# Patient Record
Sex: MALE | Race: WHITE | NOT HISPANIC OR LATINO | URBAN - METROPOLITAN AREA
[De-identification: names, ages, dates, MRNs, and addresses within clinical notes are randomized per-mention and may not be internally consistent; named-entity substitution may affect disease eponyms.]

---

## 2020-12-01 ENCOUNTER — INPATIENT (INPATIENT)
Facility: HOSPITAL | Age: 85
LOS: 36 days | Discharge: NURSING, REHAB OR LONG-TERM CARE | DRG: 85 | End: 2021-01-07
Attending: INTERNAL MEDICINE | Admitting: SURGERY
Payer: MEDICARE

## 2020-12-01 VITALS
HEIGHT: 73 IN | WEIGHT: 214.95 LBS | OXYGEN SATURATION: 100 % | TEMPERATURE: 99 F | DIASTOLIC BLOOD PRESSURE: 61 MMHG | RESPIRATION RATE: 18 BRPM | HEART RATE: 90 BPM | SYSTOLIC BLOOD PRESSURE: 134 MMHG

## 2020-12-01 DIAGNOSIS — I60.9 NONTRAUMATIC SUBARACHNOID HEMORRHAGE, UNSPECIFIED: ICD-10-CM

## 2020-12-01 LAB
ALBUMIN SERPL ELPH-MCNC: 3.3 G/DL — SIGNIFICANT CHANGE UP (ref 3.3–5)
ALP SERPL-CCNC: 61 U/L — SIGNIFICANT CHANGE UP (ref 40–120)
ALT FLD-CCNC: 18 U/L — SIGNIFICANT CHANGE UP (ref 12–78)
ANION GAP SERPL CALC-SCNC: 4 MMOL/L — LOW (ref 5–17)
APPEARANCE UR: ABNORMAL
APTT BLD: 57.5 SEC — HIGH (ref 27.5–35.5)
AST SERPL-CCNC: 34 U/L — SIGNIFICANT CHANGE UP (ref 15–37)
BASOPHILS # BLD AUTO: 0 K/UL — SIGNIFICANT CHANGE UP (ref 0–0.2)
BASOPHILS NFR BLD AUTO: 0 % — SIGNIFICANT CHANGE UP (ref 0–2)
BILIRUB SERPL-MCNC: 0.7 MG/DL — SIGNIFICANT CHANGE UP (ref 0.2–1.2)
BILIRUB UR-MCNC: NEGATIVE — SIGNIFICANT CHANGE UP
BUN SERPL-MCNC: 33 MG/DL — HIGH (ref 7–23)
CALCIUM SERPL-MCNC: 8.7 MG/DL — SIGNIFICANT CHANGE UP (ref 8.5–10.1)
CHLORIDE SERPL-SCNC: 109 MMOL/L — HIGH (ref 96–108)
CO2 SERPL-SCNC: 25 MMOL/L — SIGNIFICANT CHANGE UP (ref 22–31)
COLOR SPEC: YELLOW — SIGNIFICANT CHANGE UP
CREAT SERPL-MCNC: 1.33 MG/DL — HIGH (ref 0.5–1.3)
DIFF PNL FLD: ABNORMAL
EOSINOPHIL # BLD AUTO: 0 K/UL — SIGNIFICANT CHANGE UP (ref 0–0.5)
EOSINOPHIL NFR BLD AUTO: 0 % — SIGNIFICANT CHANGE UP (ref 0–6)
GLUCOSE SERPL-MCNC: 100 MG/DL — HIGH (ref 70–99)
GLUCOSE UR QL: NEGATIVE MG/DL — SIGNIFICANT CHANGE UP
HCT VFR BLD CALC: 39.3 % — SIGNIFICANT CHANGE UP (ref 39–50)
HGB BLD-MCNC: 12.7 G/DL — LOW (ref 13–17)
IMM GRANULOCYTES NFR BLD AUTO: 0.4 % — SIGNIFICANT CHANGE UP (ref 0–1.5)
INR BLD: 6.2 RATIO — CRITICAL HIGH (ref 0.88–1.16)
KETONES UR-MCNC: ABNORMAL
LEUKOCYTE ESTERASE UR-ACNC: ABNORMAL
LYMPHOCYTES # BLD AUTO: 0.55 K/UL — LOW (ref 1–3.3)
LYMPHOCYTES # BLD AUTO: 9.7 % — LOW (ref 13–44)
MCHC RBC-ENTMCNC: 29.5 PG — SIGNIFICANT CHANGE UP (ref 27–34)
MCHC RBC-ENTMCNC: 32.3 GM/DL — SIGNIFICANT CHANGE UP (ref 32–36)
MCV RBC AUTO: 91.2 FL — SIGNIFICANT CHANGE UP (ref 80–100)
MONOCYTES # BLD AUTO: 0.63 K/UL — SIGNIFICANT CHANGE UP (ref 0–0.9)
MONOCYTES NFR BLD AUTO: 11.2 % — SIGNIFICANT CHANGE UP (ref 2–14)
NEUTROPHILS # BLD AUTO: 4.45 K/UL — SIGNIFICANT CHANGE UP (ref 1.8–7.4)
NEUTROPHILS NFR BLD AUTO: 78.7 % — HIGH (ref 43–77)
NITRITE UR-MCNC: POSITIVE
PH UR: 5 — SIGNIFICANT CHANGE UP (ref 5–8)
PLATELET # BLD AUTO: 127 K/UL — LOW (ref 150–400)
POTASSIUM SERPL-MCNC: 4.4 MMOL/L — SIGNIFICANT CHANGE UP (ref 3.5–5.3)
POTASSIUM SERPL-SCNC: 4.4 MMOL/L — SIGNIFICANT CHANGE UP (ref 3.5–5.3)
PROT SERPL-MCNC: 7.5 GM/DL — SIGNIFICANT CHANGE UP (ref 6–8.3)
PROT UR-MCNC: 100 MG/DL
PROTHROM AB SERPL-ACNC: 65.9 SEC — HIGH (ref 10.6–13.6)
RBC # BLD: 4.31 M/UL — SIGNIFICANT CHANGE UP (ref 4.2–5.8)
RBC # FLD: 15 % — HIGH (ref 10.3–14.5)
SARS-COV-2 RNA SPEC QL NAA+PROBE: DETECTED
SODIUM SERPL-SCNC: 138 MMOL/L — SIGNIFICANT CHANGE UP (ref 135–145)
SP GR SPEC: 1.01 — SIGNIFICANT CHANGE UP (ref 1.01–1.02)
UROBILINOGEN FLD QL: NEGATIVE MG/DL — SIGNIFICANT CHANGE UP
WBC # BLD: 5.65 K/UL — SIGNIFICANT CHANGE UP (ref 3.8–10.5)
WBC # FLD AUTO: 5.65 K/UL — SIGNIFICANT CHANGE UP (ref 3.8–10.5)

## 2020-12-01 PROCEDURE — 80053 COMPREHEN METABOLIC PANEL: CPT

## 2020-12-01 PROCEDURE — 72125 CT NECK SPINE W/O DYE: CPT | Mod: 26

## 2020-12-01 PROCEDURE — 93005 ELECTROCARDIOGRAM TRACING: CPT

## 2020-12-01 PROCEDURE — 70450 CT HEAD/BRAIN W/O DYE: CPT

## 2020-12-01 PROCEDURE — 85610 PROTHROMBIN TIME: CPT

## 2020-12-01 PROCEDURE — 74177 CT ABD & PELVIS W/CONTRAST: CPT | Mod: 26

## 2020-12-01 PROCEDURE — U0005: CPT

## 2020-12-01 PROCEDURE — 87040 BLOOD CULTURE FOR BACTERIA: CPT

## 2020-12-01 PROCEDURE — 85730 THROMBOPLASTIN TIME PARTIAL: CPT

## 2020-12-01 PROCEDURE — 85025 COMPLETE CBC W/AUTO DIFF WBC: CPT

## 2020-12-01 PROCEDURE — 92610 EVALUATE SWALLOWING FUNCTION: CPT | Mod: GN

## 2020-12-01 PROCEDURE — 71260 CT THORAX DX C+: CPT | Mod: 26

## 2020-12-01 PROCEDURE — 81001 URINALYSIS AUTO W/SCOPE: CPT

## 2020-12-01 PROCEDURE — 76376 3D RENDER W/INTRP POSTPROCES: CPT

## 2020-12-01 PROCEDURE — 70551 MRI BRAIN STEM W/O DYE: CPT | Mod: 52

## 2020-12-01 PROCEDURE — U0003: CPT

## 2020-12-01 PROCEDURE — 71045 X-RAY EXAM CHEST 1 VIEW: CPT | Mod: 26

## 2020-12-01 PROCEDURE — 93010 ELECTROCARDIOGRAM REPORT: CPT

## 2020-12-01 PROCEDURE — 86769 SARS-COV-2 COVID-19 ANTIBODY: CPT

## 2020-12-01 PROCEDURE — 73200 CT UPPER EXTREMITY W/O DYE: CPT | Mod: LT

## 2020-12-01 PROCEDURE — 36415 COLL VENOUS BLD VENIPUNCTURE: CPT

## 2020-12-01 PROCEDURE — 80048 BASIC METABOLIC PNL TOTAL CA: CPT

## 2020-12-01 PROCEDURE — 97116 GAIT TRAINING THERAPY: CPT | Mod: GP

## 2020-12-01 PROCEDURE — 97530 THERAPEUTIC ACTIVITIES: CPT | Mod: GP

## 2020-12-01 PROCEDURE — 99222 1ST HOSP IP/OBS MODERATE 55: CPT | Mod: CS

## 2020-12-01 PROCEDURE — 73080 X-RAY EXAM OF ELBOW: CPT | Mod: 26,LT

## 2020-12-01 PROCEDURE — 70450 CT HEAD/BRAIN W/O DYE: CPT | Mod: 26

## 2020-12-01 PROCEDURE — 99223 1ST HOSP IP/OBS HIGH 75: CPT | Mod: CS

## 2020-12-01 PROCEDURE — 97163 PT EVAL HIGH COMPLEX 45 MIN: CPT | Mod: GP

## 2020-12-01 PROCEDURE — 92523 SPEECH SOUND LANG COMPREHEN: CPT | Mod: GN

## 2020-12-01 PROCEDURE — 72170 X-RAY EXAM OF PELVIS: CPT | Mod: 26

## 2020-12-01 PROCEDURE — 85027 COMPLETE CBC AUTOMATED: CPT

## 2020-12-01 RX ORDER — CIPROFLOXACIN LACTATE 400MG/40ML
400 VIAL (ML) INTRAVENOUS ONCE
Refills: 0 | Status: COMPLETED | OUTPATIENT
Start: 2020-12-01 | End: 2020-12-02

## 2020-12-01 RX ORDER — TAMSULOSIN HYDROCHLORIDE 0.4 MG/1
0.4 CAPSULE ORAL AT BEDTIME
Refills: 0 | Status: DISCONTINUED | OUTPATIENT
Start: 2020-12-01 | End: 2021-01-07

## 2020-12-01 RX ORDER — SERTRALINE 25 MG/1
1 TABLET, FILM COATED ORAL
Qty: 0 | Refills: 0 | DISCHARGE

## 2020-12-01 RX ORDER — FINASTERIDE 5 MG/1
5 TABLET, FILM COATED ORAL DAILY
Refills: 0 | Status: DISCONTINUED | OUTPATIENT
Start: 2020-12-01 | End: 2021-01-07

## 2020-12-01 RX ORDER — SODIUM CHLORIDE 9 MG/ML
1000 INJECTION INTRAMUSCULAR; INTRAVENOUS; SUBCUTANEOUS
Refills: 0 | Status: DISCONTINUED | OUTPATIENT
Start: 2020-12-01 | End: 2020-12-03

## 2020-12-01 RX ORDER — ONDANSETRON 8 MG/1
4 TABLET, FILM COATED ORAL EVERY 6 HOURS
Refills: 0 | Status: DISCONTINUED | OUTPATIENT
Start: 2020-12-01 | End: 2021-01-07

## 2020-12-01 RX ORDER — POTASSIUM CHLORIDE 20 MEQ
1 PACKET (EA) ORAL
Qty: 0 | Refills: 0 | DISCHARGE

## 2020-12-01 RX ORDER — TAMSULOSIN HYDROCHLORIDE 0.4 MG/1
1 CAPSULE ORAL
Qty: 0 | Refills: 0 | DISCHARGE

## 2020-12-01 RX ORDER — ACETAMINOPHEN 500 MG
650 TABLET ORAL EVERY 4 HOURS
Refills: 0 | Status: DISCONTINUED | OUTPATIENT
Start: 2020-12-01 | End: 2021-01-07

## 2020-12-01 RX ORDER — METRONIDAZOLE 500 MG
500 TABLET ORAL ONCE
Refills: 0 | Status: COMPLETED | OUTPATIENT
Start: 2020-12-01 | End: 2020-12-02

## 2020-12-01 RX ORDER — SERTRALINE 25 MG/1
50 TABLET, FILM COATED ORAL DAILY
Refills: 0 | Status: DISCONTINUED | OUTPATIENT
Start: 2020-12-01 | End: 2021-01-07

## 2020-12-01 RX ORDER — PHYTONADIONE (VIT K1) 5 MG
5 TABLET ORAL ONCE
Refills: 0 | Status: COMPLETED | OUTPATIENT
Start: 2020-12-01 | End: 2020-12-01

## 2020-12-01 RX ORDER — ATORVASTATIN CALCIUM 80 MG/1
20 TABLET, FILM COATED ORAL AT BEDTIME
Refills: 0 | Status: DISCONTINUED | OUTPATIENT
Start: 2020-12-01 | End: 2021-01-07

## 2020-12-01 RX ORDER — FINASTERIDE 5 MG/1
1 TABLET, FILM COATED ORAL
Qty: 0 | Refills: 0 | DISCHARGE

## 2020-12-01 RX ORDER — ROSUVASTATIN CALCIUM 5 MG/1
1 TABLET ORAL
Qty: 0 | Refills: 0 | DISCHARGE

## 2020-12-01 RX ADMIN — Medication 650 MILLIGRAM(S): at 14:18

## 2020-12-01 RX ADMIN — FINASTERIDE 5 MILLIGRAM(S): 5 TABLET, FILM COATED ORAL at 12:56

## 2020-12-01 RX ADMIN — SODIUM CHLORIDE 75 MILLILITER(S): 9 INJECTION INTRAMUSCULAR; INTRAVENOUS; SUBCUTANEOUS at 21:00

## 2020-12-01 RX ADMIN — TAMSULOSIN HYDROCHLORIDE 0.4 MILLIGRAM(S): 0.4 CAPSULE ORAL at 21:09

## 2020-12-01 RX ADMIN — SODIUM CHLORIDE 75 MILLILITER(S): 9 INJECTION INTRAMUSCULAR; INTRAVENOUS; SUBCUTANEOUS at 11:00

## 2020-12-01 RX ADMIN — ATORVASTATIN CALCIUM 20 MILLIGRAM(S): 80 TABLET, FILM COATED ORAL at 21:09

## 2020-12-01 RX ADMIN — Medication 101 MILLIGRAM(S): at 06:40

## 2020-12-01 RX ADMIN — SERTRALINE 50 MILLIGRAM(S): 25 TABLET, FILM COATED ORAL at 12:56

## 2020-12-01 RX ADMIN — Medication 650 MILLIGRAM(S): at 11:00

## 2020-12-01 NOTE — H&P ADULT - HISTORY OF PRESENT ILLNESS
89 yo male brought to ER by EMS after fall at home. Pt states he was walking with walker and "slid" down to his buttock. Pt denies hitting his head. 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 Pt states he had been sliding down frequently. in ED ABC intact HD stabe. No headache no neck pain. no SOB, no fever. No chest or abdominal pain. Mild  left elbow pain abrasion no focal neurological complaint, Moves all extremities  He was noted to have old ecchymosis to left side of face and scalp when asked about it pt states he does not recall hitting his head a head Ct was ordering by the ER physician after his INR was found to be at 6.20. Poor historian, very [place].

## 2020-12-01 NOTE — SWALLOW BEDSIDE ASSESSMENT ADULT - SWALLOW EVAL: DIAGNOSIS
1) Hand to mouth movement was tremulous but he was able to feed self. The pt demonstrates mild functional Oropharyngeal Presbyphagia with oral presbyphagic features that may appear heightened with foods that require mastication at times due to many missing teeth. With that being stated, the pt demonstrates sufficient oropharyngeal swallowing preservation for age nonetheless(88) when he is in an alert state. NO behavioral aspiration signs exhibited. Oropharyngeal swallowing integrity is felt to be stable for age when pt is alert and likely at usual state.  2) The pt was alert and interactive. He was somewhat anxious and distractible at times. With that being stated, his motor speech abilities were functional, his speech intelligibility was good and his verbalizations were linguistically intact. The pt was able to verbalize his needs. 1) Hand to mouth movement was tremulous but pt was able to feed self. The pt demonstrates mild functional Oropharyngeal Presbyphagia with oral presbyphagic features that may appear heightened with foods that require mastication at times due to many missing teeth. With that being stated, the pt demonstrates sufficient oropharyngeal swallowing preservation for age nonetheless(88) when he is in an alert state. NO behavioral aspiration signs exhibited. Oropharyngeal swallowing integrity is felt to be stable for age when pt is alert and likely at usual state.  2) The pt was alert and interactive. He was somewhat anxious and distractible at times. With that being stated, his motor speech abilities were functional, his speech intelligibility was good and his verbalizations were linguistically intact. The pt was able to verbalize his needs.

## 2020-12-01 NOTE — CONSULT NOTE ADULT - SUBJECTIVE AND OBJECTIVE BOX
Patient is a 88y old  Male who presents with a chief complaint of     HPI: 89 yo male brought to ER by EMS after fall at home. Pt states he was walking with walker and "slid" down to his buttock. Pt denies hitting his head. 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 Pt states he had been sliding down" frequently. He was noted to have old ecchymosis to left side of face and scalp when asked about it pt states he does not recall hitting his head a head Ct was ordering by the ER physician after his INR was foudn to be at 6.20. head Ct shows a small amount of layering IVH and a small area of hyperintensity in the left midbrain. Pt is a poor historian and is unclear why he is on full dose AC.         PAST MEDICAL & SURGICAL HISTORY:  Cerebrovascular accident (CVA), unspecified mechanism        FAMILY HISTORY:      Social Hx:  Nonsmoker, no drug or alcohol use    MEDICATIONS  (STANDING):       Allergies    No Known Allergies    Intolerances        ROS: Pertinent positives in HPI, all other ROS were reviewed and are negative.      Vital Signs Last 24 Hrs  T(C): 37.3 (01 Dec 2020 04:23), Max: 37.3 (01 Dec 2020 04:23)  T(F): 99.1 (01 Dec 2020 04:23), Max: 99.1 (01 Dec 2020 04:23)  HR: 90 (01 Dec 2020 04:23) (90 - 90)  BP: 134/61 (01 Dec 2020 04:23) (134/61 - 134/61)  BP(mean): --  RR: 18 (01 Dec 2020 04:23) (18 - 18)  SpO2: 100% (01 Dec 2020 04:23) (100% - 100%)        Constitutional: awake and alert. [place]  HEENT: + large area of old ecchymosis noted to left temporal and parietal scalp, PERRLA, EOMI,   Neck: Supple. no pain to palpation or ROM   Respiratory: Breath sounds are clear bilaterally  Cardiovascular: S1 and S2, regular  rhythm  Gastrointestinal: soft, nontender  Extremities:  ana LE pitting edema and skin discoloration   Skin: multiple areas of ecchymosis ana UE l>R, head    Neurological exam:  HF: A x O x 3 [place] confused to timeline of events,. Appropriately interactive, normal affect. Speech fluent, No Aphasia or paraphasic errors.   CN: [place], EOMI, VFF, facial sensation normal, no NLFD, tongue midline  Motor: No pronator drift, MILLER x4 antigravity strong, LUE distal contracted, + tremor  Sens: Intact to light touch  Gait/Balance: /Cannot test          Labs:   12-01    138  |  109<H>  |  33<H>  ----------------------------<  100<H>  4.4   |  25  |  1.33<H>    Ca    8.7      01 Dec 2020 04:33    TPro  7.5  /  Alb  3.3  /  TBili  0.7  /  DBili  x   /  AST  34  /  ALT  18  /  AlkPhos  61  12-01                              12.7   5.65  )-----------( 127      ( 01 Dec 2020 04:33 )             39.3       Radiology:  - CT Head:    EXAM:  CT CERVICAL SPINE                          EXAM:  CT BRAIN                            PROCEDURE DATE:  12/01/2020          INTERPRETATION:  CLINICAL INFORMATION: Status post fall on Coumadin.    TECHNIQUE: CT of the head was performed in multiple axial sections from the base of the skull to the vertex with coronal and sagittal reformats.  CT of the cervical spine was performed in bone and soft tissue windows with coronal and sagittal reformats. No intravenous contrast was administered. Beam hardening artifact slightly obscures evaluation of the posterior fossa and brainstem.    COMPARISON: No similar prior studies are available for comparison.    FINDINGS:    Head:  Parenchymal volume loss is noted with prominent ventricles and sulci. Chronic microvascular ischemic changes are identified. Gliosis and encephalomalacia is seen in the right corona radiata extending into the external capsule likely sequela of prior infarction. No acute territorial infarct is demonstrated.    A small amount of hemorrhage is layering in the lateral ventricles, right greater than left. A focus of high attenuation measuring 6 mm is seen adjacent to or within the left posterolateral aspect of the midbrain likely representing subarachnoid hemorrhage; however, a cavernoma could also be considered. Mild asymmetric thickening of the left tentorium is seen suspicious for a layering subdural hematoma.    Evaluation of the osseous structures with the appropriate window appears unremarkable. Vascular calcifications are noted. Sequela of left lens surgery is seen. Moderate mucosal thickening is seen in the right maxillary sinus. Mild mucosal thickening is seen in the left ethmoid sinus. The remaining visualized paranasal sinuses and mastoid air cells are clear. Mild left frontal scalp soft tissue swelling is noted.    Cervical spine:  Bones: Straightening of the normal cervical lordosis is seen which is likely due to muscle spasm versus patient positioning. The cervical vertebral body heights and alignment are maintained. No fracture or spondylolisthesis is demonstrated. Moderate disc space narrowing and marginal osteophyte formation is seen at C4-C5, C5-C6 and C6-C7. Multilevel posterior disc osteophyte complexes are seen without evidence of severe spinal canal stenosis. Multilevel neural foraminal stenosis is noted.    Soft tissues: The prevertebral soft tissues are unremarkable. A 7 mm right thyroid lobe nodule is noted.    Lung apices: Clear.    IMPRESSION:  1. Small amount of layering hemorrhage in the lateral ventricles, left greater than right.  2. Focus of high attenuation adjacent to or within the left posterolateral aspect of the midbrain likely representing subarachnoid hemorrhage; however, a cavernoma could also be considered.  3. Mild asymmetric thickening of the left tentorium suspicious for a layering subdural hematoma.  4. Mild left frontal scalp soft tissue swelling without evidence of an underlying calvarial fracture.  5. Multilevel degenerative changes of the cervical spine without evidence of a fracture.    Call Back:  I discussed this case with Dr. Moulton at 6:02 AM on 12/1/2020.  Hospital policies for call back including read back policy were followed. The verbal communication call back supplements this written report.      HAILEY CARO MD; Attending Radiologist  This document has been electronically signed. Dec  1 2020  6:06AM

## 2020-12-01 NOTE — PATIENT PROFILE ADULT - STATED REASON FOR ADMISSION
Fell on his buttocks while walking Fell on his buttocks while walking, denies losing his consciousness

## 2020-12-01 NOTE — H&P ADULT - NSHPREVIEWOFSYSTEMS_GEN_ALL_CORE
ROS:.  [] A ten-point review of systems was otherwise negative except as noted.  Systemic:	[ ] Fever	[ ] Chills	[ ] Night sweats    [ ] Fatigue	[ ] Other  [] Cardiovascular:  [] Pulmonary:  [] Renal/Urologic:  [] Gastrointestinal: abdominal pain, vomiting  [] Metabolic:  [] Neurologic:  [] Hematologic:  [] ENT:  [] Ophthalmologic:  [] Musculoskeletal:    [ X] Due to altered mental status/intubation, subjective information were not able to be obtained from the patient. History was obtained, to the extent possible, from review of the chart and collateral sources of information. poor historian.

## 2020-12-01 NOTE — ED PROVIDER NOTE - CARE PLAN
Principal Discharge DX:	SAH (subarachnoid hemorrhage)  Secondary Diagnosis:	Intraventricular hemorrhage  Secondary Diagnosis:	Fall, initial encounter  Secondary Diagnosis:	Supratherapeutic INR  Secondary Diagnosis:	Anticoagulated on Coumadin

## 2020-12-01 NOTE — ED PROVIDER NOTE - MUSCULOSKELETAL, MLM
contracture to LUE, + 1 pedal edema, + skin tear to left elbow.  No pain with passive rom extremities.

## 2020-12-01 NOTE — SWALLOW BEDSIDE ASSESSMENT ADULT - PHARYNGEAL PHASE
Swallow triggered in an acceptable time frame for age. Laryngeal lift on palpation during swallowing trials was very mildly decreased but felt to be functional and within age acceptable parameters. NO behavioral aspiration signs exhibited. Odynophagia was denied.

## 2020-12-01 NOTE — SWALLOW BEDSIDE ASSESSMENT ADULT - SWALLOW EVAL: RECOMMENDED DIET
SUGGEST A MECHANICAL SOFT TEXTURE DIET WITH THIN LIQUID CONSISTENCIES AS THE PATIENT TOLERATES THESE FOOD CONSISTENCIES FROM AN OROPHARYNGEAL SWALLOWING STANCE AND FOOD TEXTURES ON THIS DIET ACCOMMODATES PT'S PRESBYPHAGIC/SWALLOW PROFILE & MANY MISSING TEETH.

## 2020-12-01 NOTE — H&P ADULT - NSHPLABSRESULTS_GEN_ALL_CORE
COVID-19 PCR: Detected: Testing is performed using polymerase chain reaction (PCR) or  transcription mediated amplification (TMA). This COVID-19 (SARS-CoV-2)  nucleic acid amplification test was validated by [employer] and is  in use under the FDA Emergency Use Authorization (EUA) for clinical labs  CLIA-certified to perform high complexity testing. Test results should be  correlated with clinical presentation, patient history, and epidemiology. (12.01.20 @ 06:39)    Labs:                          12.7   5.65  )-----------( 127      ( 01 Dec 2020 04:33 )             39.3       12-01    138  |  109<H>  |  33<H>  ----------------------------<  100<H>  4.4   |  25  |  1.33<H>    Ca    8.7      01 Dec 2020 04:33    TPro  7.5  /  Alb  3.3  /  TBili  0.7  /  DBili  x   /  AST  34  /  ALT  18  /  AlkPhos  61  12-01      PT/INR - ( 01 Dec 2020 04:33 )   PT: 65.9 sec;   INR: 6.20 ratio         PTT - ( 01 Dec 2020 04:33 )  PTT:57.5 sec    < from: CT Abdomen and Pelvis w/ IV Cont (12.01.20 @ 07:50) >    CHEST:  LUNGS AND LARGE AIRWAYS: Patent central airways. No pulmonary nodules. Mild dependent atelectasis at both lung bases.  PLEURA: No pleural effusion.  VESSELS: Atherosclerotic changes of the aorta and coronary arteries.  HEART: Cardiomegaly. No pericardial effusion.  MEDIASTINUM AND ELYSSA: No lymphadenopathy.  CHEST WALL AND LOWER NECK: Small bilateral low-attenuation thyroid nodules.    ABDOMEN AND PELVIS:  LIVER: Within normal limits.  BILE DUCTS: Normal caliber.  GALLBLADDER: Cholelithiasis.  SPLEEN: Within normal limits.  PANCREAS: Within normal limits.  ADRENALS: Within normal limits.  KIDNEYS/URETERS: Left renal cysts.    BLADDER: Several bladder diverticula, the largest at the dome.  REPRODUCTIVE ORGANS: Prostate is enlarged.    BOWEL: No bowel obstruction. Appendix is normal. Colonic diverticulosis.  PERITONEUM: No ascites.  VESSELS: Atherosclerotic changes.  RETROPERITONEUM/LYMPH NODES: No lymphadenopathy or hemorrhage.  ABDOMINAL WALL: Within normal limits.  BONES: No acute fractures. Benign-appearing sclerotic lesion in the proximal right humerus. Severe right hip degenerative changes.    IMPRESSION:  No traumatic injury or hematoma.  < from: CT Cervical Spine No Cont (12.01.20 @ 06:03) >      IMPRESSION:  1. Small amount of layering hemorrhage in the lateral ventricles, left greater than right.  2. Focus of high attenuation adjacent to or within the left posterolateral aspect of the midbrain likely representing subarachnoid hemorrhage; however, a cavernoma could also be considered.  3. Mild asymmetric thickening of the left tentorium suspicious for a layering subdural hematoma.  4. Mild left frontal scalp soft tissue swelling without evidence of an underlying calvarial fracture.  5. Multilevel degenerative changes of the cervical spine without evidence of a fracture.  < from: Xray Elbow AP + Lateral, Left (12.01.20 @ 06:00) >      IMPRESSION: Cannot rule out nondisplaced lateral humeral epicondylar fracture. Consider CT.          < end of copied text >

## 2020-12-01 NOTE — SWALLOW BEDSIDE ASSESSMENT ADULT - COMMENTS
The pt was admitted to  after falling off his bed. Hospital course is notable for SAH/SDH/IVH on head CT, possible left epicondyle fracture, and  COVID-19. This profile is superimposed upon a history of a past CVA. The pt was admitted to  after falling off his bed. Hospital course is notable for SAH/SDH/IVH on head CT, possible left epicondyle fracture, and  + COVID-19. This profile is superimposed upon a history of a past CVA.

## 2020-12-01 NOTE — PROVIDER CONTACT NOTE (OTHER) - SITUATION
called and spoke to amber regarding consult and he said he will call Dr Villalobos and speak to her.

## 2020-12-01 NOTE — ED ADULT TRIAGE NOTE - CHIEF COMPLAINT QUOTE
Pt presents to ER s/p mechanical fall from standing, backwards onto buttock. Pt reports he was using walker when knee gave out and he fell to his buttock, scraping left elbow. Denies hitting head/LOC. Old bruising on left side of face. Pt denies any complaints

## 2020-12-01 NOTE — ED PROVIDER NOTE - PROGRESS NOTE DETAILS
Attending Moulton, radiology called with brain hemorrhage, called and rudyw Neuro surg PA, coverings for Dr. Licona Attending Moutlon, d/w neuro surgery - give vit k and ffp, pt needs to be admitted and neurosurg will consult CC:  Signout received from Dr. Moulton: [facility] consult appreciated, not accepted by them for admission, f/u CT C/A/P, obtain Trauma eval, pt for admission.  Case d/w Dr. Borden, incl. CT, labs, MYRNA consult.  She accepts admission to ICU for close Neuro checks.

## 2020-12-01 NOTE — SWALLOW BEDSIDE ASSESSMENT ADULT - ORAL PHASE
Bolus formation/transfer was timely to mildly+ prolonged but mechanically functional for age. Pt is missing many missing teeth which impedes masticatory efficiency. Patient was able to clear oral debris on his own after age acceptable piecemeal deglutition with the exception of scant amounts of coarse solids. Bolus formation/transfer was timely to mildly+ prolonged but mechanically functional for age. Pt is missing many teeth which impedes masticatory efficiency. Patient was able to clear oral debris on his own after age acceptable piecemeal deglutition with the exception of scant amounts of coarse solids.

## 2020-12-01 NOTE — SWALLOW BEDSIDE ASSESSMENT ADULT - SLP GENERAL OBSERVATIONS
On encounter, ecchymosis was noted on the left side of his scalp/left side of his face/left eye orbit region. An age acceptable loss of bulk was apparent in his strap muscle regions.  The pt was alert and interactive. He was somewhat anxious and distractible at times. With that being stated, his motor speech abilities were functional, his speech intelligibility was good and his verbalizations were linguistically intact. The pt was able to verbalize his needs.

## 2020-12-01 NOTE — H&P ADULT - ASSESSMENT
88 y old male on A/C fell off his bed, c/O left arm pain, no LOC, found to have IVH. SAH. Possible left epicondylar fracture. COVID positive.  Multimodal pain control  Neuro checks Q  1  Cervical collar: cleared in ED  Incentive spirometry  Vitals, IS/OS Q 4  Diet:   ( ) as tolerated ( X) NPO  DVT/GI prophylaxis  Local wound care  Activity:   bed rest   PT:Post NSX clearance   Hold A/C, was given FFP, vitamin K   Resume other home medS  Neurosurgery following : MR head, no K centra  Medical service consult, D/W Dr Jefferson. for COVID management and A fib.   Orthopedic consult Left elbow fracture, CT elbow  SW for eventual D/C planning  D/W CCU, medical service, Ortho

## 2020-12-01 NOTE — H&P ADULT - NSHPPHYSICALEXAM_GEN_ALL_CORE
Vital Signs Last 24 Hrs  T(C): 37.1 (01 Dec 2020 13:00), Max: 38.3 (01 Dec 2020 10:30)  T(F): 98.8 (01 Dec 2020 13:00), Max: 101 (01 Dec 2020 10:30)  HR: 60 (01 Dec 2020 13:00) (60 - 90)  BP: 131/61 (01 Dec 2020 13:00) (116/98 - 156/88)  BP(mean): 79 (01 Dec 2020 13:00) (77 - 104)  RR: 23 (01 Dec 2020 13:00) (18 - 29)  SpO2: 97% (01 Dec 2020 13:00) (95% - 100%)    PHYSICAL EXAM:  Constitutional: NAD, GCS: 15/15. [place]  AOX2  Eyes:  WNL  ENMT:  WNL, left facial ecchymosis, old   Neck:  WNL, non tender  Back: Non tender  Respiratory: CTABL  Cardiovascular:  S1+S2+0  Gastrointestinal: Soft, ND , NT  Genitourinary:  WNL  Extremities: NV intact, left elbow epidermal abrasion.   Vascular:  Intact  Neurological: No focal neurological deficit,  CN, motor and sensory system grossly intact.

## 2020-12-01 NOTE — ED ADULT NURSE NOTE - OBJECTIVE STATEMENT
Patient brought in by EMS s/p fall.  Patient was walking with walker and legs gave out, patient fell onto buttocks.  Patient denies head strike, LOC, patient is on coumadin.  Patient with skin lear to left elbow.  Patient denies pain at this time.  Patient reports he lives with his wife and son.  Patient with old bruising to left eye.

## 2020-12-01 NOTE — ED PROVIDER NOTE - OBJECTIVE STATEMENT
89 yo pt presents after fall.  Pt hard of hearing.  Pt states he was getting up to walk with walker and then legs gave out and he fell on his buttocks.  denies hitting head and denies LOC.  Pt currently with no pain.  Pt states he lives with wife and son, but son has hurt back and not able to help pick him up. 89 yo pt presents after fall.  Pt hard of hearing.  Pt states he was getting up to walk with walker and then legs gave out and he fell on his buttocks.  denies hitting head and denies LOC.  Pt currently with no pain.  Pt states he lives with wife and son, but son has hurt back and not able to help pick him up.    meds = coumadin 4mg, sertraline 50 mg, rosuvastatin 5m, finasteride 5mg, tamsulosin 0.4mg  PMD = Dr. Randall [phone]  Card = [place]  [phone]

## 2020-12-01 NOTE — SWALLOW BEDSIDE ASSESSMENT ADULT - SWALLOW EVAL: CRITERIA FOR SKILLED INTERVENTION MET
DO NOT FEEL THAT ACUTE SPEECH PATHOLOGY FOLLOW WOULD CHANGE CLINICAL MANAGEMENT WHILE IN ACUTE HOSPITAL SETTING. NO ORAL MOTOR FOCALITY WAS EVIDENT. PHARYNGEAL INTEGRITY WAS FUNCTIONAL FOR AGE. NO PRIMARY MOTOR SPEECH PATHOLOGY WAS EVIDENT. NO PRIMARY LINGUISTIC PATHOLOGY WAS EVIDENT. DO NOT FEEL THAT ACUTE SPEECH/SWALLOWING THERAPY WOULD BE OF CLINICAL BENEFIT. GIVEN ABOVE, WILL NOT ACTIVELY FOLLOW. RECONSULT PRN SHOULD STATUS CHANGE AND CONDITION WARRANT.

## 2020-12-01 NOTE — ED ADULT NURSE NOTE - ALCOHOL PRE SCREEN (AUDIT - C)
----- Message from Derik Flores MD sent at 12/20/2019  5:55 AM CST -----  This patient had wanted to screen for whether he had diabetes.  Tell him that his hemoglobin A1 c and fasting blood sugar are both normal and therefore indicate that he does not have diabetes   Statement Selected

## 2020-12-01 NOTE — CONSULT NOTE ADULT - ASSESSMENT
87 yo male on Coumadin with INR 6.2 with history of CVA presents to the ER after fall at home Pt was found to have a small amount of layered intraventricular hemorrhage as well as a small hyperintensity of the left midbrain   - recommend reversal of Coumadin with FFP and Vit K   - MRI brain +/-   - medical work-up for frequent falls  - trauma workup for falls   - close neuro checks   - all above d/w Dr. Licona who formulated the plan

## 2020-12-02 LAB
APTT BLD: 33.3 SEC — SIGNIFICANT CHANGE UP (ref 27.5–35.5)
BASOPHILS # BLD AUTO: 0.01 K/UL — SIGNIFICANT CHANGE UP (ref 0–0.2)
BASOPHILS NFR BLD AUTO: 0.2 % — SIGNIFICANT CHANGE UP (ref 0–2)
EOSINOPHIL # BLD AUTO: 0.01 K/UL — SIGNIFICANT CHANGE UP (ref 0–0.5)
EOSINOPHIL NFR BLD AUTO: 0.2 % — SIGNIFICANT CHANGE UP (ref 0–6)
HCT VFR BLD CALC: 37.1 % — LOW (ref 39–50)
HGB BLD-MCNC: 12.3 G/DL — LOW (ref 13–17)
IMM GRANULOCYTES NFR BLD AUTO: 0.4 % — SIGNIFICANT CHANGE UP (ref 0–1.5)
INR BLD: 1.52 RATIO — HIGH (ref 0.88–1.16)
LYMPHOCYTES # BLD AUTO: 0.52 K/UL — LOW (ref 1–3.3)
LYMPHOCYTES # BLD AUTO: 9.4 % — LOW (ref 13–44)
MCHC RBC-ENTMCNC: 29.9 PG — SIGNIFICANT CHANGE UP (ref 27–34)
MCHC RBC-ENTMCNC: 33.2 GM/DL — SIGNIFICANT CHANGE UP (ref 32–36)
MCV RBC AUTO: 90.3 FL — SIGNIFICANT CHANGE UP (ref 80–100)
MONOCYTES # BLD AUTO: 0.58 K/UL — SIGNIFICANT CHANGE UP (ref 0–0.9)
MONOCYTES NFR BLD AUTO: 10.4 % — SIGNIFICANT CHANGE UP (ref 2–14)
NEUTROPHILS # BLD AUTO: 4.42 K/UL — SIGNIFICANT CHANGE UP (ref 1.8–7.4)
NEUTROPHILS NFR BLD AUTO: 79.4 % — HIGH (ref 43–77)
PLATELET # BLD AUTO: 131 K/UL — LOW (ref 150–400)
PROTHROM AB SERPL-ACNC: 17.4 SEC — HIGH (ref 10.6–13.6)
RBC # BLD: 4.11 M/UL — LOW (ref 4.2–5.8)
RBC # FLD: 15.1 % — HIGH (ref 10.3–14.5)
SARS-COV-2 IGG SERPL QL IA: POSITIVE
SARS-COV-2 IGM SERPL IA-ACNC: 4.45 INDEX — HIGH
WBC # BLD: 5.56 K/UL — SIGNIFICANT CHANGE UP (ref 3.8–10.5)
WBC # FLD AUTO: 5.56 K/UL — SIGNIFICANT CHANGE UP (ref 3.8–10.5)

## 2020-12-02 PROCEDURE — 99223 1ST HOSP IP/OBS HIGH 75: CPT | Mod: CS

## 2020-12-02 PROCEDURE — 70450 CT HEAD/BRAIN W/O DYE: CPT | Mod: 26

## 2020-12-02 PROCEDURE — 99233 SBSQ HOSP IP/OBS HIGH 50: CPT | Mod: CS

## 2020-12-02 RX ORDER — CEFTRIAXONE 500 MG/1
INJECTION, POWDER, FOR SOLUTION INTRAMUSCULAR; INTRAVENOUS
Refills: 0 | Status: DISCONTINUED | OUTPATIENT
Start: 2020-12-02 | End: 2020-12-04

## 2020-12-02 RX ORDER — PANTOPRAZOLE SODIUM 20 MG/1
40 TABLET, DELAYED RELEASE ORAL DAILY
Refills: 0 | Status: DISCONTINUED | OUTPATIENT
Start: 2020-12-02 | End: 2021-01-07

## 2020-12-02 RX ORDER — CEFTRIAXONE 500 MG/1
1000 INJECTION, POWDER, FOR SOLUTION INTRAMUSCULAR; INTRAVENOUS ONCE
Refills: 0 | Status: COMPLETED | OUTPATIENT
Start: 2020-12-02 | End: 2020-12-02

## 2020-12-02 RX ORDER — DEXAMETHASONE 0.5 MG/5ML
6 ELIXIR ORAL DAILY
Refills: 0 | Status: DISCONTINUED | OUTPATIENT
Start: 2020-12-02 | End: 2020-12-03

## 2020-12-02 RX ORDER — CEFTRIAXONE 500 MG/1
1000 INJECTION, POWDER, FOR SOLUTION INTRAMUSCULAR; INTRAVENOUS EVERY 24 HOURS
Refills: 0 | Status: DISCONTINUED | OUTPATIENT
Start: 2020-12-03 | End: 2020-12-04

## 2020-12-02 RX ADMIN — TAMSULOSIN HYDROCHLORIDE 0.4 MILLIGRAM(S): 0.4 CAPSULE ORAL at 22:42

## 2020-12-02 RX ADMIN — Medication 6 MILLIGRAM(S): at 12:19

## 2020-12-02 RX ADMIN — PANTOPRAZOLE SODIUM 40 MILLIGRAM(S): 20 TABLET, DELAYED RELEASE ORAL at 22:42

## 2020-12-02 RX ADMIN — Medication 200 MILLIGRAM(S): at 01:16

## 2020-12-02 RX ADMIN — FINASTERIDE 5 MILLIGRAM(S): 5 TABLET, FILM COATED ORAL at 09:51

## 2020-12-02 RX ADMIN — Medication 100 MILLIGRAM(S): at 00:08

## 2020-12-02 RX ADMIN — SERTRALINE 50 MILLIGRAM(S): 25 TABLET, FILM COATED ORAL at 09:51

## 2020-12-02 RX ADMIN — CEFTRIAXONE 1000 MILLIGRAM(S): 500 INJECTION, POWDER, FOR SOLUTION INTRAMUSCULAR; INTRAVENOUS at 09:51

## 2020-12-02 RX ADMIN — ATORVASTATIN CALCIUM 20 MILLIGRAM(S): 80 TABLET, FILM COATED ORAL at 22:41

## 2020-12-02 NOTE — CONSULT NOTE ADULT - SUBJECTIVE AND OBJECTIVE BOX
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 The patient cannot describe events surrounding his admission, nor describe any sick contacts. The patient was found to have SAH upon admission imaging and prolonged INR upon admission as well. History per medical record as patient cannot provide history.       PMH: as above  PSH: as above  Meds: per reconciliation sheet, noted below  MEDICATIONS  (STANDING):  atorvastatin 20 milliGRAM(s) Oral at bedtime  cefTRIAXone Injectable.      dexAMETHasone  Injectable 6 milliGRAM(s) IV Push daily  finasteride 5 milliGRAM(s) Oral daily  pantoprazole    Tablet 40 milliGRAM(s) Oral daily  sertraline 50 milliGRAM(s) Oral daily  sodium chloride 0.9%. 1000 milliLiter(s) (75 mL/Hr) IV Continuous <Continuous>  tamsulosin 0.4 milliGRAM(s) Oral at bedtime    MEDICATIONS  (PRN):  acetaminophen   Tablet .. 650 milliGRAM(s) Oral every 4 hours PRN Temp greater or equal to 38C (100.4F), Mild Pain (1 - 3)  ondansetron Injectable 4 milliGRAM(s) IV Push every 6 hours PRN Nausea    Allergies    No Known Allergies    Intolerances      Social: no smoking, no alcohol, no illegal drugs; no recent travel, no exposure to TB  FAMILY HISTORY: unable to obtain secondary to patient medical condition      ROS unable to obtain secondary to patient medical condition     Vital Signs Last 24 Hrs  T(C): 36.8 (02 Dec 2020 12:49), Max: 36.9 (01 Dec 2020 13:30)  T(F): 98.2 (02 Dec 2020 12:49), Max: 98.5 (01 Dec 2020 13:30)  HR: 67 (02 Dec 2020 08:00) (48 - 96)  BP: 124/82 (02 Dec 2020 08:00) (117/92 - 159/87)  BP(mean): 90 (02 Dec 2020 08:00) (71 - 109)  RR: 24 (02 Dec 2020 08:00) (19 - 34)  SpO2: 95% (02 Dec 2020 08:00) (77% - 99%)  Daily     Daily Weight in k.1 (02 Dec 2020 06:06)    PE:    Constitutional: frail looking  HEENT: NC, EOMI, PERRLA, conjunctivae clear; ears and nose atraumatic; pharynx clear, ecchymosis on left side of head  Neck: supple; thyroid not palpable  Back: no tenderness  Respiratory: respiratory effort normal; clear to auscultation  Cardiovascular: S1S2 regular, no murmurs  Abdomen: soft, not tender, not distended, positive BS; no liver or spleen organomegaly  Genitourinary: no suprapubic tenderness  Musculoskeletal: no muscle tenderness, no joint swelling or tenderness  Neurological/ Psychiatric:  moving all extremities  Skin: no rashes; no palpable lesions    Labs: all available labs reviewed                        12.3   56  )-----------( 131      ( 02 Dec 2020 06:39 )             37.1     12-    140  |  111<H>  |  26<H>  ----------------------------<  89  4.0   |  22  |  1.08    Ca    8.4<L>      02 Dec 2020 09:54    TPro  6.8  /  Alb  2.8<L>  /  TBili  1.1  /  DBili  x   /  AST  36  /  ALT  18  /  AlkPhos  59  12-02     LIVER FUNCTIONS - ( 02 Dec 2020 09:54 )  Alb: 2.8 g/dL / Pro: 6.8 gm/dL / ALK PHOS: 59 U/L / ALT: 18 U/L / AST: 36 U/L / GGT: x           Urinalysis Basic - ( 01 Dec 2020 21:30 )    Color: Yellow / Appearance: Slightly Turbid / S.015 / pH: x  Gluc: x / Ketone: Trace  / Bili: Negative / Urobili: Negative mg/dL   Blood: x / Protein: 100 mg/dL / Nitrite: Positive   Leuk Esterase: Moderate / RBC: 6-10 /HPF / WBC >50   Sq Epi: x / Non Sq Epi: Few / Bacteria: Moderate    COVID-19 PCR . (20 @ 06:39)    COVID-19 PCR: Detected: Testing is performed using polymerase chain reaction (PCR) or  transcription mediated amplification (TMA). This COVID-19 (SARS-CoV-2)  nucleic acid amplification test was validated by [employer] and is  in use under the FDA Emergency Use Authorization (EUA) for clinical labs  CLIA-certified to perform high complexity testing. Test results should be  correlated with clinical presentation, patient history, and epidemiology.        < from: CT Head No Cont (20 @ 10:10) >    EXAM:  CT BRAIN                            PROCEDURE DATE:  2020          INTERPRETATION:  Exam Date: 2020 10:10 AM    CT head without IV contrast    CLINICAL INFORMATION: follwo up    SAH, IVH    TECHNIQUE: Contiguous axial 5 mm sections were obtained through the head.    COMPARISON: CT head 2020    FINDINGS:  Reidentified is a 6 mm focus of hyperattenuation in the left ambient cistern versus the posterior lateral aspect of the left cerebral peduncle, may reflect a stable smallfocus of subarachnoid hemorrhage versus a cavernoma, unchanged. Very minimal layering hemorrhage in the lateral ventricles has decreased since prior exam. There are multiple chronic lacunar infarcts in the right basal ganglia. The ventricles and sulci are prominent, compatible with age-related generalized cerebral volume loss.   There is no CT evidence for acute cerebral cortical infarct.  There is periventricular and subcortical white matter hypoattenuation,  most compatible with chronic microvascular ischemic changes.   No mass effect is found in the brain.  There is no midline shift or herniation pattern.      Small amount of mucosal inflammation is present in the visualized portion of the right maxillary sinus.    IMPRESSION:    Reidentified is a 6 mm focus of hyperattenuation in the left ambient cistern versus the posterior lateral aspect of the left cerebral peduncle, may reflect a stable small focus of subarachnoid hemorrhage versus a cavernoma, unchanged. Very minimal layering hemorrhage in the lateral ventricles has decreased since prior exam. There are multiple chronic lacunar infarcts in the right basal ganglia.    Rest of the chronic changes as above.      < end of copied text >        < from: CT Abdomen and Pelvis w/ IV Cont (20 @ 07:50) >    IMPRESSION:  No traumatic injury or hematoma.      < end of copied text >        Radiology: all available radiological tests reviewed    Advanced directives addressed: full resuscitation

## 2020-12-02 NOTE — PROGRESS NOTE ADULT - ASSESSMENT
88 y old male on A/C fell off his bed, c/O left arm pain, no LOC, found to have IVH. SAH. Possible left epicondylar fracture. COVID positive.  Multimodal pain control, neuro checks stable  Neuro checks Q  4  Cervical collar: cleared in ED  Incentive spirometry  Vitals, IS/OS Q 4  Diet:   (X ) as tolerated ( ) NPO after speech and swallow.   DVT/GI prophylaxis  Local wound care  Activity:   bed rest   PT:Post NSX clearance   Hold A/C, was given FFP, vitamin K   Resume other home medS  Neurosurgery following : MR head, no K centra. MR is down, [facility] head [facility]  Medical service consult, D/W Dr Jefferson for COVID management and A fib.   Orthopedic consult Left elbow fracture, CT elbow, DW resident they recommended CT that was ordered yesterday.  SW for eventual D/C planning  D/W CCU, medical service, Ortho  TX to Med surg if cleared by neuro.  Tried calling family, the number provided was not working.

## 2020-12-02 NOTE — CONSULT NOTE ADULT - ASSESSMENT
89 yo male with past medical history CVA in past, [place], afib, BPH, depression admitted on 12/1 for evaluation of falling at home while walking with his walker and sliding onto his buttocks. The patient cannot describe events surrounding his admission, nor describe any sick contacts. The patient was found to have SAH upon admission imaging and prolonged INR upon admission as well. History per medical record as patient cannot provide history.   1. Patient admitted s/p fall, sustained subarachnoid hemorrhage, found to have COVID-19 swab positive; the patient is comfortable on room air and is maintaining good oxygenation; duration of illness unclear and patient is not complaining of any specific complaints related to COVID-19 infection  - follow up cultures   - serial cbc and monitor temperature   - reviewed prior medical records to evaluate for resistant or atypical pathogens   - will hold on steroids and remdesivir for now  - continue isolation  - no need for antibiotics and will maintain off antibiotics  - neurosurgery evaluation in progress  2. other issues: per medicine

## 2020-12-02 NOTE — CONSULT NOTE ADULT - SUBJECTIVE AND OBJECTIVE BOX
CC- s/p fall    HPI:  87 yo male brought to ER by EMS after fall at home. Pt states he was walking with walker and "slid" down to his buttock. Pt denies hitting his head. 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 Pt states he had been sliding down frequently. in ED ABC intact HD stabe. No headache no neck pain. no SOB, no fever. No chest or abdominal pain. Mild  left elbow pain abrasion no focal neurological complaint, Moves all extremities  He was noted to have old ecchymosis to left side of face and scalp when asked about it pt states he does not recall hitting his head a head Ct was ordering by the ER physician after his INR was found to be at 6.20. James historian, very [place]. (01 Dec 2020 10:00) Patient admitted by trauma. Medical consult called for medical management    PMH- CVA, afib chronic, depression, BPH  PSH- denies  Soc hx- denies smoking  Fam hx- both parents are     20- denies acute complaints, but noticed to have cough    Review of system- All 10 systems reviewed and is as per HPI otherwise negative.     T(C): 36.7 (20 @ 10:24), Max: 37.1 (20 @ 13:00)  HR: 65 (20 @ 07:00) (48 - 96)  BP: 129/70 (20 @ 07:00) (117/92 - 159/87)  RR: 23 (20 @ 07:00) (19 - 34)  SpO2: 96% (20 @ 07:00) (77% - 99%)  Wt(kg): --    LABS:                        12.3   5.56  )-----------( 131      ( 02 Dec 2020 06:39 )             37.1         138  |  109<H>  |  33<H>  ----------------------------<  100<H>  4.4   |  25  |  1.33<H>    Ca    8.7      01 Dec 2020 04:33    TPro  7.5  /  Alb  3.3  /  TBili  0.7  /  DBili  x   /  AST  34  /  ALT  18  /  AlkPhos  61  12-    PT/INR - ( 02 Dec 2020 06:39 )   PT: 17.4 sec;   INR: 1.52 ratio       PTT - ( 02 Dec 2020 06:39 )  PTT:33.3 sec    Urinalysis Basic - ( 01 Dec 2020 21:30 )  Color: Yellow / Appearance: Slightly Turbid / S.015 / pH: x  Gluc: x / Ketone: Trace  / Bili: Negative / Urobili: Negative mg/dL   Blood: x / Protein: 100 mg/dL / Nitrite: Positive   Leuk Esterase: Moderate / RBC: 6-10 /HPF / WBC >50   Sq Epi: x / Non Sq Epi: Few / Bacteria: Moderate      RADIOLOGY & ADDITIONAL TESTS:  EXAM:  CT BRAIN                        PROCEDURE DATE:  2020      INTERPRETATION:  Exam Date: 2020 10:10 AM    CT head without IV contrast    CLINICAL INFORMATION: follwo up    SAH, IVH    TECHNIQUE: Contiguous axial 5 mm sections were obtained through the head.    COMPARISON: CT head 2020    FINDINGS:  Reidentified is a 6 mm focus of hyperattenuation in the left ambient cistern versus the posterior lateral aspect of the left cerebral peduncle, may reflect a stable small focus of subarachnoid hemorrhage versus a cavernoma, unchanged. Very minimal layering hemorrhage in the lateral ventricles has decreased since prior exam. There are multiple chronic lacunar infarcts in the right basal ganglia. The ventricles and sulci are prominent, compatible with age-related generalized cerebral volume loss.   There is no CT evidence for acute cerebral cortical infarct.  There is periventricular and subcortical white matter hypoattenuation,  most compatible with chronic microvascular ischemic changes.   No mass effect is found in the brain.  There is no midline shift or herniation pattern.      Small amount of mucosal inflammation is present in the visualized portion of the right maxillary sinus.    IMPRESSION:    Reidentified is a 6 mm focus of hyperattenuation in the left ambient cistern versus the posterior lateral aspect of the left cerebral peduncle, may reflect a stable small focus of subarachnoid hemorrhage versus a cavernoma, unchanged. Very minimal layering hemorrhage in the lateral ventricles has decreased since prior exam. There are multiple chronic lacunar infarcts in the right basal ganglia.    PHYSICAL EXAM:  GENERAL: NAD, well-groomed, well-developed  HEAD:  Atraumatic, Normocephalic  EYES: EOMI, PERRLA, conjunctiva and sclera clear  HEENT: Moist mucous membranes  NECK: Supple, No JVD  NERVOUS SYSTEM:  Alert & Oriented X3, grossly non-focal  CHEST/LUNG: Clear to auscultation bilaterally; No rales, rhonchi, wheezing, or rubs  HEART: Regular rate and rhythm; No murmurs, rubs, or gallops  ABDOMEN: Soft, Nontender, Nondistended; Bowel sounds present  GENITOURINARY- Voiding, no palpable bladder  EXTREMITIES:  2+ Peripheral Pulses, No clubbing, cyanosis, or edema  MUSCULOSKELTAL- No muscle tenderness, Muscle tone normal, No joint tenderness, no Joint swelling, Joint range of motion-normal  SKIN- multiple bruises all over    Daily Weight in k.1 (02 Dec 2020 06:06)    MEDICATIONS  (STANDING):  atorvastatin 20 milliGRAM(s) Oral at bedtime  cefTRIAXone Injectable.      dexAMETHasone  Injectable 6 milliGRAM(s) IV Push daily  finasteride 5 milliGRAM(s) Oral daily  pantoprazole    Tablet 40 milliGRAM(s) Oral daily  sertraline 50 milliGRAM(s) Oral daily  sodium chloride 0.9%. 1000 milliLiter(s) (75 mL/Hr) IV Continuous <Continuous>  tamsulosin 0.4 milliGRAM(s) Oral at bedtime    MEDICATIONS  (PRN):  acetaminophen   Tablet .. 650 milliGRAM(s) Oral every 4 hours PRN Temp greater or equal to 38C (100.4F), Mild Pain (1 - 3)  ondansetron Injectable 4 milliGRAM(s) IV Push every 6 hours PRN Nausea      Assessment/Plan  #S/p fall with SAH and IVH  Trauma and NS f/u appreciated  Repeat imaging studies noted  Off Coumadin  PT eval/ OOB    #COVID positive  Start on IV Dexamethasone 6mg daily  ID eval  COVID ab pending  ID eval    #UTI  Start Rocephin, ID eval for further abx    #Afib  Hold coumadin 2 to SAH  Venodynes  Rate controlled for now    #DVT proph- venodynes    #Dispo- thank you for consult, will follow with you. D/w pt

## 2020-12-03 PROCEDURE — 76376 3D RENDER W/INTRP POSTPROCES: CPT | Mod: 26

## 2020-12-03 PROCEDURE — 99233 SBSQ HOSP IP/OBS HIGH 50: CPT | Mod: CS

## 2020-12-03 PROCEDURE — 73200 CT UPPER EXTREMITY W/O DYE: CPT | Mod: 26,LT

## 2020-12-03 PROCEDURE — 99223 1ST HOSP IP/OBS HIGH 75: CPT | Mod: CS

## 2020-12-03 PROCEDURE — 99232 SBSQ HOSP IP/OBS MODERATE 35: CPT

## 2020-12-03 RX ADMIN — CEFTRIAXONE 1000 MILLIGRAM(S): 500 INJECTION, POWDER, FOR SOLUTION INTRAMUSCULAR; INTRAVENOUS at 09:27

## 2020-12-03 RX ADMIN — Medication 6 MILLIGRAM(S): at 09:27

## 2020-12-03 RX ADMIN — TAMSULOSIN HYDROCHLORIDE 0.4 MILLIGRAM(S): 0.4 CAPSULE ORAL at 21:31

## 2020-12-03 RX ADMIN — PANTOPRAZOLE SODIUM 40 MILLIGRAM(S): 20 TABLET, DELAYED RELEASE ORAL at 09:27

## 2020-12-03 RX ADMIN — ATORVASTATIN CALCIUM 20 MILLIGRAM(S): 80 TABLET, FILM COATED ORAL at 21:31

## 2020-12-03 RX ADMIN — SERTRALINE 50 MILLIGRAM(S): 25 TABLET, FILM COATED ORAL at 09:27

## 2020-12-03 RX ADMIN — FINASTERIDE 5 MILLIGRAM(S): 5 TABLET, FILM COATED ORAL at 09:27

## 2020-12-03 NOTE — PHYSICAL THERAPY INITIAL EVALUATION ADULT - GENERAL OBSERVATIONS, REHAB EVAL
pt rec'd supine in bed in CCU, monitors in place, SCDs, LUE appears somewhat contracted (h/o CVA), droplet prec in place. L face w/ ecchymosis, LUE w/ purpura. pt rec'd supine in bed in CCU, monitors in place, SCDs, LUE appears somewhat contracted/flexor tone (h/o CVA), droplet prec in place. L face w/ ecchymosis, LUE w/ purpura.

## 2020-12-03 NOTE — PHYSICAL THERAPY INITIAL EVALUATION ADULT - LEVEL OF INDEPENDENCE: BED TO CHAIR, REHAB EVAL
deferred at this time pending clarification of activity level, CT elbow results (as pt reliant on use of walker PTA)

## 2020-12-03 NOTE — PHYSICAL THERAPY INITIAL EVALUATION ADULT - PERTINENT HX OF CURRENT PROBLEM, REHAB EVAL
Pt is a y/o M who presents to  s/p fall at home. Pt reports he was walking with walker and "slid" down to his buttock. Pt reports he did not hit his head and he had some mild left elbow pain and abrasion. CT Head pos. stable SAH and small amount of layering hemorrhage in the lateral ventricles, L>R. Xray elbow susceptible for lateral epicondyle fx, CT elbow pending. Pt found to be COVID+. Pt is a 87 y/o M who presents to  s/p fall at home. Pt reports he was walking with walker and "slid" down to his buttock. Pt reports he did not hit his head and he had some mild left elbow pain and abrasion. CT Head pos. stable SAH and small amount of layering hemorrhage in the lateral ventricles, L>R. Xray elbow susceptible for lateral epicondyle fx, CT elbow pending. Pt found to be COVID+. Pt is a 89 y/o M who presents to  s/p fall at home. Pt reports he was walking with walker and "slid" down to his buttock. Pt reports he did not hit his head and he had some mild left elbow pain and abrasion. CT Head pos. stable SAH and small amount of layering IVH, L>R. Xray elbow susceptible for lateral epicondyle fx, CT elbow pending. Pt found to be COVID+. Pt is a 89 y/o M who presents to  s/p fall at home. Pt reports he was walking with walker and "slid" down to his buttock. Pt reports he did not hit his head and he had some mild left elbow pain and an abrasion. CT Head pos. stable SAH and small amount of layering IVH, L>R. Xray elbow susceptible for lateral epicondyle fx, CT elbow pending. Pt found to be COVID+. Pt to HH s/p fall at home. Pt reports he was walking with walker and "slid" down to his buttock. Pt reports he did not hit his head and he had some mild left elbow pain and an abrasion. INR was found to be at 6.20. given FFP, vitamin K. CTH +SAH, IVH.  RCTH +stable SAH and small amount of layering IVH, L>R. XR L elbow cannot r/o lateral epicondyle fx, CT elbow pending. Pt found to be COVID+. Pt to HH s/p fall at home. Pt reports he was walking with walker and "slid" down to his buttock(vs slid OOB? 2 stories in chart). Pt reports he did not hit his head and he had some mild left elbow pain and an abrasion. INR was found to be at 6.20. given FFP, vitamin K. CTH +SAH, IVH.  RCTH +stable SAH and small amount of layering IVH, L>R. XR L elbow cannot r/o lateral epicondyle fx, CT elbow pending. Pt found to be COVID+.

## 2020-12-03 NOTE — PHYSICAL THERAPY INITIAL EVALUATION ADULT - ACTIVE RANGE OF MOTION EXAMINATION, REHAB EVAL
bilateral  lower extremity Active ROM was WFL (within functional limits)/Right UE Active ROM was WFL (within functional limits)/LUE fisted-pt able to unfist but + flex contacture over 4th/5th MCPs, rests in L wrist flex-able to ext to neutral, elbow NT, shld flex ~45 deg bilateral  lower extremity Active ROM was WFL (within functional limits)/Right UE Active ROM was WFL (within functional limits)/LUE fisted-pt able to unfist but + flex over 4th/5th MCPs, rests in L wrist flex-able to ext to neutral, elbow NT, shld flex ~45 deg

## 2020-12-03 NOTE — PHYSICAL THERAPY INITIAL EVALUATION ADULT - DIAGNOSIS, PT EVAL
SAH, COVID+ SAH, IVH, COVID+ SAH, IVH, Possible L epicondylar fx, COVID+ S/p fall with SAH and IVH, COVID+

## 2020-12-03 NOTE — PROGRESS NOTE ADULT - ASSESSMENT
87 yo male on Coumadin with INR 6.2 with history of CVA presents to the ER after fall at home Pt was found to have a small amount of layered intraventricular hemorrhage as well as a small hyperintensity of the left midbrain   - s/p reversal of Coumadin with FFP and Vit K   - awaiting MRI brain +/- to r/o cavernoma   - medical work-up for frequent falls  - trauma workup for falls   - covid +, ID following   - ok for OOB w/ PT   - ok to down grade from NSX standpoint   - all above d/w Dr. Licona who formulated the plan

## 2020-12-03 NOTE — CDI QUERY NOTE - NSCDIOTHERTXTBX_GEN_ALL_CORE_HH
This patient was admitted s/p fall at home and diagnosed with a subarachnoid hemorrhage and intraventricular hemorrhage:    HP Adult 12-1-20 @ 10:00  88 y old male on A/C fell off his bed, c/O left arm pain, no LOC, found to have IVH. SAH.    Consult Note Adult-Hospitalist Attending 12-2-20 @ 10:47  #S/p fall with SAH and IVH  Trauma and NS f/u appreciated    Can the type of subarachnoid and intraventricular hemorrhage be further specified?  A) Subarachnoid and intraventricular hemorrhage likely traumatic.  B) Subarachnoid and intraventricular hemorrhage likely non-traumatic.  C) Other type of subarachnoid and intraventricular hemorrhage, please specify:

## 2020-12-03 NOTE — PHYSICAL THERAPY INITIAL EVALUATION ADULT - PRECAUTIONS/LIMITATIONS, REHAB EVAL
isolation precautions/fall precautions isolation precautions/seizure precautions/fall precautions seizure precautions/strict bedrest order-per nsg pt was cleared (verbally) for OOB/isolation precautions/fall precautions isolation precautions/hearing precautions/seizure precautions/strict bedrest order-per nsg pt was cleared (verbally) for OOB/fall precautions

## 2020-12-04 PROCEDURE — 99231 SBSQ HOSP IP/OBS SF/LOW 25: CPT

## 2020-12-04 PROCEDURE — 99232 SBSQ HOSP IP/OBS MODERATE 35: CPT | Mod: CS

## 2020-12-04 PROCEDURE — 70551 MRI BRAIN STEM W/O DYE: CPT | Mod: 26,52

## 2020-12-04 RX ADMIN — ATORVASTATIN CALCIUM 20 MILLIGRAM(S): 80 TABLET, FILM COATED ORAL at 21:42

## 2020-12-04 RX ADMIN — PANTOPRAZOLE SODIUM 40 MILLIGRAM(S): 20 TABLET, DELAYED RELEASE ORAL at 08:37

## 2020-12-04 RX ADMIN — TAMSULOSIN HYDROCHLORIDE 0.4 MILLIGRAM(S): 0.4 CAPSULE ORAL at 21:42

## 2020-12-04 RX ADMIN — FINASTERIDE 5 MILLIGRAM(S): 5 TABLET, FILM COATED ORAL at 08:37

## 2020-12-04 RX ADMIN — SERTRALINE 50 MILLIGRAM(S): 25 TABLET, FILM COATED ORAL at 08:37

## 2020-12-04 RX ADMIN — CEFTRIAXONE 1000 MILLIGRAM(S): 500 INJECTION, POWDER, FOR SOLUTION INTRAMUSCULAR; INTRAVENOUS at 08:37

## 2020-12-04 NOTE — PROGRESS NOTE ADULT - ASSESSMENT
88 y old male on A/C fell off his bed, c/O left arm pain, no LOC, found to have IVH. SAH. Possible left epicondylar fracture. COVID positive.    # acute subdural hematoma and intraventricular hemorrhage S/p fall   - CT scan as above.    - no neuro deficits   - off coumadin   - MRI pending to r/o cavernosa   - PT eval/ OOB    #COVID positive  - s/p dexamethasone     #UTI  - dc abx, asymptomatic abnormal uti    #Afib  - Rate controlled for now  - no AC at this time given SAH and IVH     # r/o LUE fracture  - degenerative changes without acute fracture.        88 y old male on A/C fell off his bed, c/O left arm pain, no LOC, found to have IVH. SAH. Possible left epicondylar fracture. COVID positive.    # acute subdural hematoma and intraventricular hemorrhage S/p fall   - CT scan as above.    - no neuro deficits   - off coumadin   - MRI pending to r/o cavernosa   - PT eval/ OOB    #COVID positive  - s/p dexamethasone     #UTI  - dc abx, asymptomatic abnormal uti    #Afib  - Rate controlled for now  - no AC at this time given SAH and IVH     # r/o LUE fracture  - degenerative changes without acute fracture.     Pt seen and examined with JOCELYNE Birmingham. I personally had a face to face encounter with the patient, examined the patient myself and reviewed the plan of care with the patient and said JOCELYNE [place]. I agree with the assessment and plan of JOCELYNE Birmingham as stated and discussed.

## 2020-12-04 NOTE — PROGRESS NOTE ADULT - ASSESSMENT
A/P:  SAH vs cavernoma  ?SDH  IVH  Closed head injury  Pending MRI brain  Neurosurgery on consult  Monitor neurochecks  Fall risk precautions  GI/DVT prophylaxis  Pain control  F/U labs  Cont current care and meds  Pt will be monitored for signs of evolution/resolution of pathology and surgical intervention as required and warranted  Pt aware of and agrees with all of the above

## 2020-12-05 PROCEDURE — 99232 SBSQ HOSP IP/OBS MODERATE 35: CPT | Mod: CS

## 2020-12-05 PROCEDURE — 99231 SBSQ HOSP IP/OBS SF/LOW 25: CPT

## 2020-12-05 RX ADMIN — SERTRALINE 50 MILLIGRAM(S): 25 TABLET, FILM COATED ORAL at 09:37

## 2020-12-05 RX ADMIN — ATORVASTATIN CALCIUM 20 MILLIGRAM(S): 80 TABLET, FILM COATED ORAL at 21:07

## 2020-12-05 RX ADMIN — FINASTERIDE 5 MILLIGRAM(S): 5 TABLET, FILM COATED ORAL at 09:37

## 2020-12-05 RX ADMIN — TAMSULOSIN HYDROCHLORIDE 0.4 MILLIGRAM(S): 0.4 CAPSULE ORAL at 21:06

## 2020-12-05 RX ADMIN — PANTOPRAZOLE SODIUM 40 MILLIGRAM(S): 20 TABLET, DELAYED RELEASE ORAL at 09:37

## 2020-12-05 NOTE — PROGRESS NOTE ADULT - ASSESSMENT
87 yo male on Coumadin with admitting INR 6.2 with history of CVA presents to the ER after fall at home Pt was found to have a small amount of layered intraventricular hemorrhage as well as a small hyperintensity of the left midbrain.  INR now 1.52, +covid, s/p MR head with no findings of cavernoma.     - MR head demonstrated no cavernoma, stable redemonstration of cistern SAH with no root cause.    - medical work-up for frequent falls  - trauma workup for falls   - covid +, ID following   - ok for OOB w/ PT   - No neurosurgical intervention at this time, please Repeat CT head and re-consult if patient neurologically declines  - Risk/benefit weigh-in on restarting home AC, would advise waiting a period due to relatively acute tSAH.    - All above d/w Dr. Licona who formulated the plan

## 2020-12-05 NOTE — PROGRESS NOTE ADULT - ASSESSMENT
A/P:  SAH vs cavernoma  ?SDH  IVH  Closed head injury  Neurosurgery on consult  Hospitalist on consult for medical management  Monitor neurochecks  Fall risk precautions  GI/DVT prophylaxis  Pain control  F/U labs  Cont current care and meds  Pt will be monitored for signs of evolution/resolution of pathology and surgical intervention as required and warranted  Pt aware of and agrees with all of the above

## 2020-12-06 LAB
CULTURE RESULTS: SIGNIFICANT CHANGE UP
SPECIMEN SOURCE: SIGNIFICANT CHANGE UP

## 2020-12-06 PROCEDURE — 99231 SBSQ HOSP IP/OBS SF/LOW 25: CPT

## 2020-12-06 RX ADMIN — FINASTERIDE 5 MILLIGRAM(S): 5 TABLET, FILM COATED ORAL at 08:55

## 2020-12-06 RX ADMIN — SERTRALINE 50 MILLIGRAM(S): 25 TABLET, FILM COATED ORAL at 08:56

## 2020-12-06 RX ADMIN — PANTOPRAZOLE SODIUM 40 MILLIGRAM(S): 20 TABLET, DELAYED RELEASE ORAL at 08:56

## 2020-12-06 RX ADMIN — ATORVASTATIN CALCIUM 20 MILLIGRAM(S): 80 TABLET, FILM COATED ORAL at 22:39

## 2020-12-06 RX ADMIN — TAMSULOSIN HYDROCHLORIDE 0.4 MILLIGRAM(S): 0.4 CAPSULE ORAL at 22:39

## 2020-12-07 LAB — SARS-COV-2 RNA SPEC QL NAA+PROBE: DETECTED

## 2020-12-07 PROCEDURE — 99233 SBSQ HOSP IP/OBS HIGH 50: CPT | Mod: CS

## 2020-12-07 PROCEDURE — 99232 SBSQ HOSP IP/OBS MODERATE 35: CPT | Mod: CS

## 2020-12-07 RX ADMIN — FINASTERIDE 5 MILLIGRAM(S): 5 TABLET, FILM COATED ORAL at 10:19

## 2020-12-07 RX ADMIN — ATORVASTATIN CALCIUM 20 MILLIGRAM(S): 80 TABLET, FILM COATED ORAL at 21:30

## 2020-12-07 RX ADMIN — SERTRALINE 50 MILLIGRAM(S): 25 TABLET, FILM COATED ORAL at 10:19

## 2020-12-07 RX ADMIN — PANTOPRAZOLE SODIUM 40 MILLIGRAM(S): 20 TABLET, DELAYED RELEASE ORAL at 10:19

## 2020-12-07 RX ADMIN — TAMSULOSIN HYDROCHLORIDE 0.4 MILLIGRAM(S): 0.4 CAPSULE ORAL at 21:30

## 2020-12-07 NOTE — PROGRESS NOTE ADULT - ASSESSMENT
88 y old male on A/C fell off his bed, c/O left arm pain, no LOC, found to have IVH. SAH. Possible left epicondylar fracture. COVID positive.  Multimodal pain control, neuro checks stable  Neuro checks Q  4  Cervical collar: cleared in ED  Incentive spirometry  Vitals, IS/OS Q 4  Diet:   (X ) as tolerated ( ) NPO after speech and swallow.   DVT/GI prophylaxis  Local wound care  Activity:   bed rest   PT:Post NSX clearance   Hold A/C, was given FFP, vitamin K   Resume other home medS  Neurosurgery following : MR head, no K centra. no intervention   Medical service consult, D/W Dr Jefferson for COVID management and A fib.   Orthopedic consult Left elbow fracture, CT elbow, : no fracture.    for eventual D/C planning  D/W medical service, cleared by neuro, ortho stable from trauma stand point.   D/C planning pending negative COVID testing

## 2020-12-07 NOTE — PROVIDER CONTACT NOTE (OTHER) - SITUATION
notified Dr Randall office of admission please fax over d/c paperwork once pt is d/c to [phone]

## 2020-12-07 NOTE — PROGRESS NOTE ADULT - ASSESSMENT
88 y old male on A/C fell off his bed, c/O left arm pain, no LOC, found to have IVH. SAH. Possible left epicondylar fracture. COVID positive.    # acute subdural hematoma and intraventricular hemorrhage S/p fall   - MRI as above  - no neuro deficits   - off coumadin   - neurosurgery eval   - PT eval/ OOB    #COVID positive  - s/p dexamethasone     #UTI  - dc abx, asymptomatic abnormal uti    #Afib  - Rate controlled for now  - no AC at this time given SAH and IVH     # r/o LUE fracture  - degenerative changes without acute fracture.     dispo: no contraindications to DC,. pending covid swab may be candidate for [facility]

## 2020-12-08 PROCEDURE — 99231 SBSQ HOSP IP/OBS SF/LOW 25: CPT

## 2020-12-08 RX ADMIN — FINASTERIDE 5 MILLIGRAM(S): 5 TABLET, FILM COATED ORAL at 08:13

## 2020-12-08 RX ADMIN — ATORVASTATIN CALCIUM 20 MILLIGRAM(S): 80 TABLET, FILM COATED ORAL at 21:01

## 2020-12-08 RX ADMIN — PANTOPRAZOLE SODIUM 40 MILLIGRAM(S): 20 TABLET, DELAYED RELEASE ORAL at 08:13

## 2020-12-08 RX ADMIN — SERTRALINE 50 MILLIGRAM(S): 25 TABLET, FILM COATED ORAL at 08:13

## 2020-12-08 RX ADMIN — TAMSULOSIN HYDROCHLORIDE 0.4 MILLIGRAM(S): 0.4 CAPSULE ORAL at 21:01

## 2020-12-08 NOTE — PROGRESS NOTE ADULT - ASSESSMENT
A/P:  SAH   IVH  Closed head injury  Neurosurgery on consult, no intervention  Hospitalist on consult for medical management  Monitor neurochecks, stable  Fall risk precautions  GI/DVT prophylaxis  Pain control  F/U labs  Cont current care and meds  SS for d/c planning  Pt will be monitored for signs of evolution/resolution of pathology and surgical intervention as required and warranted  Pt aware of and agrees with all of the above

## 2020-12-09 PROCEDURE — 99231 SBSQ HOSP IP/OBS SF/LOW 25: CPT | Mod: CS

## 2020-12-09 PROCEDURE — 99232 SBSQ HOSP IP/OBS MODERATE 35: CPT | Mod: CS

## 2020-12-09 RX ADMIN — ATORVASTATIN CALCIUM 20 MILLIGRAM(S): 80 TABLET, FILM COATED ORAL at 23:21

## 2020-12-09 RX ADMIN — TAMSULOSIN HYDROCHLORIDE 0.4 MILLIGRAM(S): 0.4 CAPSULE ORAL at 23:21

## 2020-12-09 RX ADMIN — PANTOPRAZOLE SODIUM 40 MILLIGRAM(S): 20 TABLET, DELAYED RELEASE ORAL at 11:13

## 2020-12-09 RX ADMIN — SERTRALINE 50 MILLIGRAM(S): 25 TABLET, FILM COATED ORAL at 11:13

## 2020-12-09 RX ADMIN — FINASTERIDE 5 MILLIGRAM(S): 5 TABLET, FILM COATED ORAL at 11:13

## 2020-12-09 NOTE — PROGRESS NOTE ADULT - ASSESSMENT
88 y old male on A/C fell off his bed, c/O left arm pain, no LOC, found to have IVH. SAH. Possible left epicondylar fracture. COVID positive.    # acute subdural hematoma and intraventricular hemorrhage S/p fall   - MRI as above  - no neuro deficits   - off coumadin   - neurosurgery eval   - PT eval/ OOB    #COVID positive  - s/p dexamethasone     #UTI  - dc abx, asymptomatic abnormal uti    #Afib  - Rate controlled for now  - no AC at this time given SAH and IVH     # r/o LUE fracture  - degenerative changes without acute fracture.     dispo: pending sub acute rehab placement. pt is not a candidate for acute rehab at [place] given cognitive decline.

## 2020-12-09 NOTE — PROGRESS NOTE ADULT - ASSESSMENT
88 y old male on A/C fell off his bed, c/O left arm pain, no LOC, found to have IVH. SAH. Possible left epicondylar fracture. COVID positive.  Multimodal pain control, neuro checks stable  Neuro checks Q  4  Cervical collar: cleared in ED  Incentive spirometry  Vitals, IS/OS Q 4  Diet:   (X ) as tolerated ( ) NPO after speech and swallow.   DVT/GI prophylaxis  Local wound care  Activity:   bed rest   PT:Post NSX clearance   Hold A/C, was given FFP, vitamin K   Resume other home medS  Neurosurgery following : MR head, no K centra. no intervention   Medical service consult,  for COVID management and A fib.   Orthopedic consult Left elbow fracture, CT elbow, : no fracture.   SW for eventual D/C planning  D/W medical service, cleared by neuro, ortho stable from trauma stand point.   D/C planning pending negative COVID testing  Pt on ALC. Cleared from trauma stand point.

## 2020-12-10 DIAGNOSIS — S50.02XD CONTUSION OF LEFT ELBOW, SUBSEQUENT ENCOUNTER: ICD-10-CM

## 2020-12-10 LAB — SARS-COV-2 RNA SPEC QL NAA+PROBE: DETECTED

## 2020-12-10 PROCEDURE — 99231 SBSQ HOSP IP/OBS SF/LOW 25: CPT | Mod: CS

## 2020-12-10 RX ADMIN — PANTOPRAZOLE SODIUM 40 MILLIGRAM(S): 20 TABLET, DELAYED RELEASE ORAL at 09:37

## 2020-12-10 RX ADMIN — SERTRALINE 50 MILLIGRAM(S): 25 TABLET, FILM COATED ORAL at 09:37

## 2020-12-10 RX ADMIN — FINASTERIDE 5 MILLIGRAM(S): 5 TABLET, FILM COATED ORAL at 09:37

## 2020-12-10 RX ADMIN — ATORVASTATIN CALCIUM 20 MILLIGRAM(S): 80 TABLET, FILM COATED ORAL at 21:48

## 2020-12-10 RX ADMIN — TAMSULOSIN HYDROCHLORIDE 0.4 MILLIGRAM(S): 0.4 CAPSULE ORAL at 21:46

## 2020-12-10 NOTE — PROGRESS NOTE ADULT - ASSESSMENT
88 y old male on A/C fell off his bed, c/O left arm pain, no LOC, found to have IVH. SAH. Possible left epicondylar fracture. COVID positive.    # acute subdural hematoma and intraventricular hemorrhage S/p fall   - MRI as above  - no neuro deficits   - off coumadin   - neurosurgery eval   - PT eval/ OOB    #COVID positive  - s/p dexamethasone     #UTI  - dc abx, asymptomatic abnormal uti    #Afib  - Rate controlled for now  - no AC at this time given SAH and IVH     # r/o LUE fracture  - degenerative changes without acute fracture.     dispo: pending sub acute rehab placement. pt is not a candidate for acute rehab at [place] given cognitive decline.   covid + 12/10

## 2020-12-11 PROCEDURE — 99231 SBSQ HOSP IP/OBS SF/LOW 25: CPT | Mod: CS

## 2020-12-11 RX ADMIN — TAMSULOSIN HYDROCHLORIDE 0.4 MILLIGRAM(S): 0.4 CAPSULE ORAL at 21:18

## 2020-12-11 RX ADMIN — PANTOPRAZOLE SODIUM 40 MILLIGRAM(S): 20 TABLET, DELAYED RELEASE ORAL at 09:58

## 2020-12-11 RX ADMIN — FINASTERIDE 5 MILLIGRAM(S): 5 TABLET, FILM COATED ORAL at 09:58

## 2020-12-11 RX ADMIN — ATORVASTATIN CALCIUM 20 MILLIGRAM(S): 80 TABLET, FILM COATED ORAL at 21:18

## 2020-12-11 RX ADMIN — SERTRALINE 50 MILLIGRAM(S): 25 TABLET, FILM COATED ORAL at 09:58

## 2020-12-11 NOTE — PROGRESS NOTE ADULT - ASSESSMENT
88 y old male on A/C fell off his bed, c/O left arm pain, no LOC, found to have IVH. SAH. Possible left epicondylar fracture. COVID positive.    # acute subdural hematoma and intraventricular hemorrhage S/p fall   - MRI as above  - no neuro deficits   - off coumadin   - neurosurgery no interventions   - PT eval/ OOB    #COVID positive  - s/p dexamethasone     #UTI  - dc abx, asymptomatic abnormal uti    #Afib  - Rate controlled for now  - no AC at this time given SAH and IVH     # r/o LUE fracture  - degenerative changes without acute fracture.     dispo: pending sub acute rehab placement. pt is not a candidate for acute rehab at [place] given cognitive decline.   covid + 12/10 - retest 12/13

## 2020-12-12 PROCEDURE — 99232 SBSQ HOSP IP/OBS MODERATE 35: CPT | Mod: CS

## 2020-12-12 RX ADMIN — PANTOPRAZOLE SODIUM 40 MILLIGRAM(S): 20 TABLET, DELAYED RELEASE ORAL at 08:32

## 2020-12-12 RX ADMIN — SERTRALINE 50 MILLIGRAM(S): 25 TABLET, FILM COATED ORAL at 08:32

## 2020-12-12 RX ADMIN — ATORVASTATIN CALCIUM 20 MILLIGRAM(S): 80 TABLET, FILM COATED ORAL at 21:16

## 2020-12-12 RX ADMIN — TAMSULOSIN HYDROCHLORIDE 0.4 MILLIGRAM(S): 0.4 CAPSULE ORAL at 21:16

## 2020-12-12 RX ADMIN — FINASTERIDE 5 MILLIGRAM(S): 5 TABLET, FILM COATED ORAL at 08:32

## 2020-12-12 NOTE — PROGRESS NOTE ADULT - ASSESSMENT
88 y old male on A/C fell off his bed, c/O left arm pain, no LOC, found to have IVH. SAH. Possible left epicondylar fracture. COVID positive.    # acute subdural hematoma and intraventricular hemorrhage S/p fall   - MRI as above  - no neuro deficits   - off coumadin   - neurosurgery no interventions   - PT eval/ OOB    #COVID positive  - s/p dexamethasone     #UTI  - dc abx, asymptomatic abnormal uti    #Afib  - Rate controlled for now  - no AC at this time given SAH and IVH     # r/o LUE fracture  - degenerative changes without acute fracture.     dispo: pending sub acute rehab placement. pt is not a candidate for acute rehab at [place] given cognitive decline.   covid + 12/10 - retest 12/13

## 2020-12-13 LAB — SARS-COV-2 RNA SPEC QL NAA+PROBE: DETECTED

## 2020-12-13 PROCEDURE — 99232 SBSQ HOSP IP/OBS MODERATE 35: CPT | Mod: CS

## 2020-12-13 RX ADMIN — ATORVASTATIN CALCIUM 20 MILLIGRAM(S): 80 TABLET, FILM COATED ORAL at 21:26

## 2020-12-13 RX ADMIN — SERTRALINE 50 MILLIGRAM(S): 25 TABLET, FILM COATED ORAL at 08:37

## 2020-12-13 RX ADMIN — PANTOPRAZOLE SODIUM 40 MILLIGRAM(S): 20 TABLET, DELAYED RELEASE ORAL at 08:37

## 2020-12-13 RX ADMIN — TAMSULOSIN HYDROCHLORIDE 0.4 MILLIGRAM(S): 0.4 CAPSULE ORAL at 21:26

## 2020-12-13 RX ADMIN — FINASTERIDE 5 MILLIGRAM(S): 5 TABLET, FILM COATED ORAL at 08:37

## 2020-12-13 NOTE — PROGRESS NOTE ADULT - ASSESSMENT
88 y old male on A/C fell off his bed, c/O left arm pain, no LOC, found to have IVH. SAH. Possible left epicondylar fracture. COVID positive.    # acute subdural hematoma and intraventricular hemorrhage S/p fall   - MRI as above  - no neuro deficits   - off coumadin   - neurosurgery no interventions   - PT eval/ OOB    #COVID positive  - s/p dexamethasone     #UTI  - dc abx, asymptomatic abnormal uti    #Afib  - Rate controlled for now  - no AC at this time given SAH and IVH     # r/o LUE fracture  - degenerative changes without acute fracture.     dispo: pending sub acute rehab placement. pt is not a candidate for acute rehab at [place] given cognitive decline.   covid + 12/10 -  12/13

## 2020-12-14 PROCEDURE — 99232 SBSQ HOSP IP/OBS MODERATE 35: CPT | Mod: CS

## 2020-12-14 PROCEDURE — 99231 SBSQ HOSP IP/OBS SF/LOW 25: CPT

## 2020-12-14 RX ADMIN — ATORVASTATIN CALCIUM 20 MILLIGRAM(S): 80 TABLET, FILM COATED ORAL at 22:06

## 2020-12-14 RX ADMIN — SERTRALINE 50 MILLIGRAM(S): 25 TABLET, FILM COATED ORAL at 09:22

## 2020-12-14 RX ADMIN — TAMSULOSIN HYDROCHLORIDE 0.4 MILLIGRAM(S): 0.4 CAPSULE ORAL at 20:45

## 2020-12-14 RX ADMIN — FINASTERIDE 5 MILLIGRAM(S): 5 TABLET, FILM COATED ORAL at 09:22

## 2020-12-14 RX ADMIN — PANTOPRAZOLE SODIUM 40 MILLIGRAM(S): 20 TABLET, DELAYED RELEASE ORAL at 09:22

## 2020-12-14 NOTE — PROGRESS NOTE ADULT - ASSESSMENT
A/P:  SAH   IVH  Closed head injury  Neurosurgery on consult, no intervention  Hospitalist for medical management  Monitor neurochecks, stable  Fall risk precautions  GI/DVT prophylaxis  Pain control  F/U labs  Cont current care and meds  SS for d/c planning  Pt will be monitored for signs of evolution/resolution of pathology and surgical intervention as required and warranted  Pt aware of and agrees with all of the above

## 2020-12-14 NOTE — PROGRESS NOTE ADULT - ASSESSMENT
88 y old male on A/C fell off his bed, c/O left arm pain, no LOC, found to have IVH. SAH. Possible left epicondylar fracture. COVID positive.    # acute subdural hematoma and intraventricular hemorrhage S/p fall --- stable   - MRI as above  - no neuro deficits   - off coumadin   - neurosurgery no interventions   - PT eval/ OOB    #COVID positive  - s/p dexamethasone     #UTI  - dc abx, asymptomatic abnormal uti    #Afib  - Rate controlled for now  - no AC at this time given SAH and IVH     # r/o LUE fracture  - degenerative changes without acute fracture.     dispo: pending sub acute rehab placement. pt is not a candidate for acute rehab at [place] given cognitive decline.   covid + 12/10 -  12/13 retest 12/6

## 2020-12-15 PROCEDURE — 99232 SBSQ HOSP IP/OBS MODERATE 35: CPT | Mod: CS

## 2020-12-15 PROCEDURE — 99231 SBSQ HOSP IP/OBS SF/LOW 25: CPT

## 2020-12-15 RX ADMIN — ATORVASTATIN CALCIUM 20 MILLIGRAM(S): 80 TABLET, FILM COATED ORAL at 21:39

## 2020-12-15 RX ADMIN — TAMSULOSIN HYDROCHLORIDE 0.4 MILLIGRAM(S): 0.4 CAPSULE ORAL at 21:39

## 2020-12-15 RX ADMIN — PANTOPRAZOLE SODIUM 40 MILLIGRAM(S): 20 TABLET, DELAYED RELEASE ORAL at 08:33

## 2020-12-15 RX ADMIN — FINASTERIDE 5 MILLIGRAM(S): 5 TABLET, FILM COATED ORAL at 08:33

## 2020-12-15 RX ADMIN — SERTRALINE 50 MILLIGRAM(S): 25 TABLET, FILM COATED ORAL at 08:33

## 2020-12-15 NOTE — PROGRESS NOTE ADULT - ASSESSMENT
88 y old male on A/C fell off his bed, c/O left arm pain, no LOC, found to have IVH. SAH. Possible left epicondylar fracture. COVID positive.    # acute subdural hematoma and intraventricular hemorrhage S/p fall --- stable   - MRI as above  - no neuro deficits   - off coumadin   - neurosurgery no interventions   - PT eval/ OOB    #COVID positive  - s/p dexamethasone     #UTI  - dc abx, asymptomatic abnormal uti    #Afib  - Rate controlled for now  - no AC at this time given SAH and IVH     # r/o LUE fracture  - degenerative changes without acute fracture.     dispo: pending sub acute rehab placement. pt is not a candidate for acute rehab at [place] given cognitive decline.   covid + 12/10 -  12/13 retest tonight 12/16

## 2020-12-15 NOTE — PROGRESS NOTE ADULT - ASSESSMENT
A/P:  SAH   IVH  Closed head injury  Neurosurgery on consult, no intervention  Hospitalist for medical management  Monitor neurochecks, stable  Fall risk precautions  GI/DVT prophylaxis  Pain control  F/U labs  Cont current care and meds  SS for d/c planning, awaiting COVID placement  Pt stable and cleared from trauma surgical standpoint  Pt will be monitored for signs of evolution/resolution of pathology and surgical intervention as required and warranted  Pt aware of and agrees with all of the above

## 2020-12-16 LAB — SARS-COV-2 RNA SPEC QL NAA+PROBE: DETECTED

## 2020-12-16 PROCEDURE — 99231 SBSQ HOSP IP/OBS SF/LOW 25: CPT | Mod: CS

## 2020-12-16 RX ADMIN — SERTRALINE 50 MILLIGRAM(S): 25 TABLET, FILM COATED ORAL at 10:15

## 2020-12-16 RX ADMIN — TAMSULOSIN HYDROCHLORIDE 0.4 MILLIGRAM(S): 0.4 CAPSULE ORAL at 21:26

## 2020-12-16 RX ADMIN — FINASTERIDE 5 MILLIGRAM(S): 5 TABLET, FILM COATED ORAL at 10:15

## 2020-12-16 RX ADMIN — ATORVASTATIN CALCIUM 20 MILLIGRAM(S): 80 TABLET, FILM COATED ORAL at 21:26

## 2020-12-16 RX ADMIN — PANTOPRAZOLE SODIUM 40 MILLIGRAM(S): 20 TABLET, DELAYED RELEASE ORAL at 10:15

## 2020-12-16 NOTE — PROGRESS NOTE ADULT - ASSESSMENT
88 y old male on A/C fell off his bed, c/O left arm pain, no LOC, found to have IVH. SAH. Possible left epicondylar fracture. COVID positive.    # acute subdural hematoma and intraventricular hemorrhage S/p fall --- stable   - MRI as above  - no neuro deficits   - off coumadin   - neurosurgery no interventions   - PT eval/ OOB    #COVID positive --- acute illness resolved  - conts to test positive   - s/p dexamethasone     #UTI  - dc abx, asymptomatic abnormal uti    #Afib  - Rate controlled for now  - no AC at this time given SAH and IVH     # r/o LUE fracture  - degenerative changes without acute fracture.     dispo: pending sub acute rehab placement. pt is not a candidate for acute rehab at [place] given cognitive decline.   covid + 12/10 -  12/13 - 12/16  retest 12/19

## 2020-12-17 PROCEDURE — 99231 SBSQ HOSP IP/OBS SF/LOW 25: CPT | Mod: CS

## 2020-12-17 PROCEDURE — 99231 SBSQ HOSP IP/OBS SF/LOW 25: CPT

## 2020-12-17 RX ADMIN — FINASTERIDE 5 MILLIGRAM(S): 5 TABLET, FILM COATED ORAL at 10:29

## 2020-12-17 RX ADMIN — ATORVASTATIN CALCIUM 20 MILLIGRAM(S): 80 TABLET, FILM COATED ORAL at 20:41

## 2020-12-17 RX ADMIN — TAMSULOSIN HYDROCHLORIDE 0.4 MILLIGRAM(S): 0.4 CAPSULE ORAL at 20:41

## 2020-12-17 RX ADMIN — PANTOPRAZOLE SODIUM 40 MILLIGRAM(S): 20 TABLET, DELAYED RELEASE ORAL at 10:29

## 2020-12-17 RX ADMIN — SERTRALINE 50 MILLIGRAM(S): 25 TABLET, FILM COATED ORAL at 10:29

## 2020-12-17 NOTE — PROGRESS NOTE ADULT - ASSESSMENT
A/P:  SAH   IVH  Closed head injury  Neurosurgery on consult, no intervention  Hospitalist for medical management  Monitor neurochecks, stable  Fall risk precautions  GI/DVT prophylaxis  Pain control  F/U labs  Cont current care and meds  SS for d/c planning, awaiting COVID placement  Pt stable and cleared from trauma surgical standpoint, ALC  Pt aware of and agrees with all of the above

## 2020-12-17 NOTE — PROGRESS NOTE ADULT - ASSESSMENT
88 y old male on A/C fell off his bed, c/O left arm pain, no LOC, found to have IVH. SAH. Possible left epicondylar fracture. COVID positive.    # acute subdural hematoma and intraventricular hemorrhage S/p fall --- stable   - MRI as above  - no neuro deficits   - off coumadin   - neurosurgery no interventions   - PT eval/ OOB    #COVID positive --- acute illness resolved  - conts to test positive   - s/p dexamethasone     #UTI  - dc abx, asymptomatic abnormal uti    #Afib  - Rate controlled for now  - no AC at this time given SAH and IVH     # r/o LUE fracture  - degenerative changes without acute fracture.     dispo: pending sub acute rehab placement. pt is not a candidate for acute rehab at [facility] given cognitive decline.   covid + 12/10 -  12/13 - 12/16  retest 12/19

## 2020-12-18 PROCEDURE — 99231 SBSQ HOSP IP/OBS SF/LOW 25: CPT | Mod: CS

## 2020-12-18 RX ADMIN — PANTOPRAZOLE SODIUM 40 MILLIGRAM(S): 20 TABLET, DELAYED RELEASE ORAL at 09:08

## 2020-12-18 RX ADMIN — FINASTERIDE 5 MILLIGRAM(S): 5 TABLET, FILM COATED ORAL at 09:08

## 2020-12-18 RX ADMIN — SERTRALINE 50 MILLIGRAM(S): 25 TABLET, FILM COATED ORAL at 09:09

## 2020-12-18 RX ADMIN — ATORVASTATIN CALCIUM 20 MILLIGRAM(S): 80 TABLET, FILM COATED ORAL at 20:53

## 2020-12-18 RX ADMIN — TAMSULOSIN HYDROCHLORIDE 0.4 MILLIGRAM(S): 0.4 CAPSULE ORAL at 20:53

## 2020-12-18 NOTE — PROGRESS NOTE ADULT - ASSESSMENT
88 y old male on A/C fell off his bed, c/O left arm pain, no LOC, found to have IVH. SAH. Possible left epicondylar fracture. COVID positive.    # acute subdural hematoma and intraventricular hemorrhage S/p fall --- stable   - MRI as above  - no neuro deficits   - off coumadin   - neurosurgery no interventions   - PT eval/ OOB    #COVID positive --- acute illness resolved  - conts to test positive   - s/p dexamethasone     #UTI  - dc abx, asymptomatic abnormal uti    #Afib  - Rate controlled for now  - no AC at this time given SAH and IVH     # r/o LUE fracture  - degenerative changes without acute fracture.     dispo: pending sub acute rehab placement. pt is not a candidate for acute rehab at [place] given cognitive decline.   covid + 12/10 -  12/13 - 12/16  retest 12/19

## 2020-12-19 LAB — SARS-COV-2 RNA SPEC QL NAA+PROBE: DETECTED

## 2020-12-19 PROCEDURE — 99231 SBSQ HOSP IP/OBS SF/LOW 25: CPT | Mod: CS

## 2020-12-19 RX ADMIN — SERTRALINE 50 MILLIGRAM(S): 25 TABLET, FILM COATED ORAL at 09:58

## 2020-12-19 RX ADMIN — TAMSULOSIN HYDROCHLORIDE 0.4 MILLIGRAM(S): 0.4 CAPSULE ORAL at 21:27

## 2020-12-19 RX ADMIN — PANTOPRAZOLE SODIUM 40 MILLIGRAM(S): 20 TABLET, DELAYED RELEASE ORAL at 10:01

## 2020-12-19 RX ADMIN — ATORVASTATIN CALCIUM 20 MILLIGRAM(S): 80 TABLET, FILM COATED ORAL at 21:27

## 2020-12-19 RX ADMIN — FINASTERIDE 5 MILLIGRAM(S): 5 TABLET, FILM COATED ORAL at 10:01

## 2020-12-19 NOTE — PROGRESS NOTE ADULT - ASSESSMENT
88 y old male on A/C fell off his bed, c/O left arm pain, no LOC, found to have IVH. SAH. Possible left epicondylar fracture. COVID positive.    # acute subdural hematoma and intraventricular hemorrhage S/p fall --- stable   - MRI as above  - no neuro deficits   - off coumadin    - neurosurgery no interventions   - PT eval/ OOB / requires bartolo lift and full assist OOB     #COVID positive --- acute illness resolved  - conts to test positive   - s/p dexamethasone     #UTI  - dc abx, asymptomatic abnormal uti    #Afib  - Rate controlled for now  - no AC at this time given SAH and IVH     # r/o LUE fracture  - degenerative changes without acute fracture.     dispo: pending sub acute rehab placement and negative covid swab  covid + 12/10 -  12/13 - 12/16  - 12/19

## 2020-12-20 LAB
ANION GAP SERPL CALC-SCNC: 4 MMOL/L — LOW (ref 5–17)
BUN SERPL-MCNC: 20 MG/DL — SIGNIFICANT CHANGE UP (ref 7–23)
CALCIUM SERPL-MCNC: 8.8 MG/DL — SIGNIFICANT CHANGE UP (ref 8.5–10.1)
CHLORIDE SERPL-SCNC: 105 MMOL/L — SIGNIFICANT CHANGE UP (ref 96–108)
CO2 SERPL-SCNC: 29 MMOL/L — SIGNIFICANT CHANGE UP (ref 22–31)
CREAT SERPL-MCNC: 1.11 MG/DL — SIGNIFICANT CHANGE UP (ref 0.5–1.3)
GLUCOSE SERPL-MCNC: 93 MG/DL — SIGNIFICANT CHANGE UP (ref 70–99)
HCT VFR BLD CALC: 42.4 % — SIGNIFICANT CHANGE UP (ref 39–50)
HGB BLD-MCNC: 13.8 G/DL — SIGNIFICANT CHANGE UP (ref 13–17)
MCHC RBC-ENTMCNC: 29.7 PG — SIGNIFICANT CHANGE UP (ref 27–34)
MCHC RBC-ENTMCNC: 32.5 GM/DL — SIGNIFICANT CHANGE UP (ref 32–36)
MCV RBC AUTO: 91.4 FL — SIGNIFICANT CHANGE UP (ref 80–100)
PLATELET # BLD AUTO: 157 K/UL — SIGNIFICANT CHANGE UP (ref 150–400)
POTASSIUM SERPL-MCNC: 4.3 MMOL/L — SIGNIFICANT CHANGE UP (ref 3.5–5.3)
POTASSIUM SERPL-SCNC: 4.3 MMOL/L — SIGNIFICANT CHANGE UP (ref 3.5–5.3)
RBC # BLD: 4.64 M/UL — SIGNIFICANT CHANGE UP (ref 4.2–5.8)
RBC # FLD: 15 % — HIGH (ref 10.3–14.5)
SODIUM SERPL-SCNC: 138 MMOL/L — SIGNIFICANT CHANGE UP (ref 135–145)
WBC # BLD: 7.86 K/UL — SIGNIFICANT CHANGE UP (ref 3.8–10.5)
WBC # FLD AUTO: 7.86 K/UL — SIGNIFICANT CHANGE UP (ref 3.8–10.5)

## 2020-12-20 RX ADMIN — TAMSULOSIN HYDROCHLORIDE 0.4 MILLIGRAM(S): 0.4 CAPSULE ORAL at 21:22

## 2020-12-20 RX ADMIN — SERTRALINE 50 MILLIGRAM(S): 25 TABLET, FILM COATED ORAL at 10:06

## 2020-12-20 RX ADMIN — PANTOPRAZOLE SODIUM 40 MILLIGRAM(S): 20 TABLET, DELAYED RELEASE ORAL at 10:10

## 2020-12-20 RX ADMIN — FINASTERIDE 5 MILLIGRAM(S): 5 TABLET, FILM COATED ORAL at 10:10

## 2020-12-20 RX ADMIN — ATORVASTATIN CALCIUM 20 MILLIGRAM(S): 80 TABLET, FILM COATED ORAL at 21:21

## 2020-12-21 PROCEDURE — 99231 SBSQ HOSP IP/OBS SF/LOW 25: CPT | Mod: CS

## 2020-12-21 RX ADMIN — FINASTERIDE 5 MILLIGRAM(S): 5 TABLET, FILM COATED ORAL at 09:55

## 2020-12-21 RX ADMIN — SERTRALINE 50 MILLIGRAM(S): 25 TABLET, FILM COATED ORAL at 09:55

## 2020-12-21 RX ADMIN — PANTOPRAZOLE SODIUM 40 MILLIGRAM(S): 20 TABLET, DELAYED RELEASE ORAL at 09:55

## 2020-12-21 RX ADMIN — TAMSULOSIN HYDROCHLORIDE 0.4 MILLIGRAM(S): 0.4 CAPSULE ORAL at 21:19

## 2020-12-21 RX ADMIN — ATORVASTATIN CALCIUM 20 MILLIGRAM(S): 80 TABLET, FILM COATED ORAL at 21:19

## 2020-12-21 NOTE — PROGRESS NOTE ADULT - ASSESSMENT
88 y old male on A/C fell off his bed, c/O left arm pain, no LOC, found to have IVH. SAH. Possible left epicondylar fracture. COVID positive.    # acute subdural hematoma and intraventricular hemorrhage S/p fall --- stable   - MRI as above  - no neuro deficits   - off coumadin    - neurosurgery no interventions   - PT eval/ OOB / requires bartolo lift and full assist OOB     #COVID positive --- acute illness resolved  - conts to test positive   - retest 12/22   - s/p dexamethasone     #UTI  - dc abx, asymptomatic abnormal uti    #Afib  - Rate controlled for now  - no AC at this time given SAH and IVH     # r/o LUE fracture  - degenerative changes without acute fracture.     dispo: pending sub acute rehab placement and negative covid swab  covid + 12/10 -  12/13 - 12/16  - 12/19

## 2020-12-22 LAB — SARS-COV-2 RNA SPEC QL NAA+PROBE: DETECTED

## 2020-12-22 PROCEDURE — 99232 SBSQ HOSP IP/OBS MODERATE 35: CPT | Mod: CS

## 2020-12-22 RX ADMIN — FINASTERIDE 5 MILLIGRAM(S): 5 TABLET, FILM COATED ORAL at 09:44

## 2020-12-22 RX ADMIN — SERTRALINE 50 MILLIGRAM(S): 25 TABLET, FILM COATED ORAL at 09:40

## 2020-12-22 RX ADMIN — TAMSULOSIN HYDROCHLORIDE 0.4 MILLIGRAM(S): 0.4 CAPSULE ORAL at 21:03

## 2020-12-22 RX ADMIN — ATORVASTATIN CALCIUM 20 MILLIGRAM(S): 80 TABLET, FILM COATED ORAL at 21:03

## 2020-12-22 RX ADMIN — PANTOPRAZOLE SODIUM 40 MILLIGRAM(S): 20 TABLET, DELAYED RELEASE ORAL at 09:44

## 2020-12-22 NOTE — PROGRESS NOTE ADULT - ASSESSMENT
88 y old male on A/C fell off his bed, c/O left arm pain, no LOC, found to have IVH. SAH. Possible left epicondylar fracture. COVID positive.    # acute subdural hematoma and intraventricular hemorrhage S/p fall --- stable   - MRI as above  - no neuro deficits   - off coumadin    - neurosurgery no interventions   - PT eval/ OOB / requires bartolo lift and full assist OOB     #COVID positive --- acute illness resolved  - conts to test positive   - retest 12/25  - s/p dexamethasone     #UTI  - asymptomatic UTI no need for abx at this time.     #Afib  - Rate controlled for now  - no AC at this time given SAH and IVH     # r/o LUE fracture  - degenerative changes without acute fracture.     dispo: pending sub acute rehab placement and negative covid swab  covid + 12/10 -  12/13 - 12/16  - 12/19 - 12/22

## 2020-12-23 PROCEDURE — 99232 SBSQ HOSP IP/OBS MODERATE 35: CPT | Mod: CS

## 2020-12-23 PROCEDURE — 99231 SBSQ HOSP IP/OBS SF/LOW 25: CPT

## 2020-12-23 RX ADMIN — PANTOPRAZOLE SODIUM 40 MILLIGRAM(S): 20 TABLET, DELAYED RELEASE ORAL at 10:27

## 2020-12-23 RX ADMIN — SERTRALINE 50 MILLIGRAM(S): 25 TABLET, FILM COATED ORAL at 10:27

## 2020-12-23 RX ADMIN — FINASTERIDE 5 MILLIGRAM(S): 5 TABLET, FILM COATED ORAL at 10:27

## 2020-12-23 RX ADMIN — TAMSULOSIN HYDROCHLORIDE 0.4 MILLIGRAM(S): 0.4 CAPSULE ORAL at 21:35

## 2020-12-23 RX ADMIN — ATORVASTATIN CALCIUM 20 MILLIGRAM(S): 80 TABLET, FILM COATED ORAL at 21:34

## 2020-12-24 PROCEDURE — 99231 SBSQ HOSP IP/OBS SF/LOW 25: CPT | Mod: CS

## 2020-12-24 RX ORDER — NYSTATIN CREAM 100000 [USP'U]/G
1 CREAM TOPICAL
Refills: 0 | Status: DISCONTINUED | OUTPATIENT
Start: 2020-12-24 | End: 2021-01-07

## 2020-12-24 RX ADMIN — FINASTERIDE 5 MILLIGRAM(S): 5 TABLET, FILM COATED ORAL at 08:17

## 2020-12-24 RX ADMIN — NYSTATIN CREAM 1 APPLICATION(S): 100000 CREAM TOPICAL at 21:47

## 2020-12-24 RX ADMIN — Medication 650 MILLIGRAM(S): at 09:37

## 2020-12-24 RX ADMIN — TAMSULOSIN HYDROCHLORIDE 0.4 MILLIGRAM(S): 0.4 CAPSULE ORAL at 21:46

## 2020-12-24 RX ADMIN — Medication 650 MILLIGRAM(S): at 10:30

## 2020-12-24 RX ADMIN — PANTOPRAZOLE SODIUM 40 MILLIGRAM(S): 20 TABLET, DELAYED RELEASE ORAL at 08:17

## 2020-12-24 RX ADMIN — ATORVASTATIN CALCIUM 20 MILLIGRAM(S): 80 TABLET, FILM COATED ORAL at 21:46

## 2020-12-24 RX ADMIN — SERTRALINE 50 MILLIGRAM(S): 25 TABLET, FILM COATED ORAL at 08:16

## 2020-12-24 NOTE — PROGRESS NOTE ADULT - ASSESSMENT
88 y old male on A/C fell off his bed, c/O left arm pain, no LOC, found to have IVH. SAH. Possible left epicondylar fracture. COVID positive.    # acute subdural hematoma and intraventricular hemorrhage S/p fall --- stable   - MRI as above  - no neuro deficits   - off coumadin    - neurosurgery no interventions   - PT eval/ OOB / requires bartolo lift and full assist OOB     #COVID positive --- acute illness resolved  - conts to test positive   - retest 12/25  - s/p dexamethasone     #UTI  - asymptomatic UTI no need for abx at this time.     #Afib  - Rate controlled for now  - no AC at this time given SAH and IVH     # r/o LUE fracture  - degenerative changes without acute fracture.     dispo: pending sub acute rehab placement and negative covid swab  covid + 12/10 -  12/13 - 12/16  - 12/19 - 12/22   retest 12/25

## 2020-12-25 LAB — SARS-COV-2 RNA SPEC QL NAA+PROBE: SIGNIFICANT CHANGE UP

## 2020-12-25 PROCEDURE — 99231 SBSQ HOSP IP/OBS SF/LOW 25: CPT | Mod: CS

## 2020-12-25 RX ADMIN — SERTRALINE 50 MILLIGRAM(S): 25 TABLET, FILM COATED ORAL at 09:10

## 2020-12-25 RX ADMIN — NYSTATIN CREAM 1 APPLICATION(S): 100000 CREAM TOPICAL at 09:10

## 2020-12-25 RX ADMIN — FINASTERIDE 5 MILLIGRAM(S): 5 TABLET, FILM COATED ORAL at 09:10

## 2020-12-25 RX ADMIN — TAMSULOSIN HYDROCHLORIDE 0.4 MILLIGRAM(S): 0.4 CAPSULE ORAL at 20:15

## 2020-12-25 RX ADMIN — ATORVASTATIN CALCIUM 20 MILLIGRAM(S): 80 TABLET, FILM COATED ORAL at 20:16

## 2020-12-25 RX ADMIN — NYSTATIN CREAM 1 APPLICATION(S): 100000 CREAM TOPICAL at 20:16

## 2020-12-25 RX ADMIN — PANTOPRAZOLE SODIUM 40 MILLIGRAM(S): 20 TABLET, DELAYED RELEASE ORAL at 09:10

## 2020-12-25 NOTE — PROGRESS NOTE ADULT - ASSESSMENT
88 y old male on A/C fell off his bed, c/O left arm pain, no LOC, found to have IVH. SAH. Possible left epicondylar fracture. COVID positive.    # acute subdural hematoma and intraventricular hemorrhage S/p fall --- stable   - MRI as above  - no neuro deficits   - off coumadin    - neurosurgery no interventions   - PT eval/ OOB / requires bartolo lift and full assist OOB     #COVID positive --- acute illness resolved  - tested negative today  - retest 12/26  - s/p dexamethasone     #UTI  - asymptomatic UTI no need for abx at this time.     #Afib  - Rate controlled for now  - no AC at this time given SAH and IVH     # r/o LUE fracture  - degenerative changes without acute fracture.     dispo: pending sub acute rehab placement , requires 2 negative swabs. tested negative today, will retest today.   covid + 12/10 -  12/13 - 12/16  - 12/19 - 12/22   retest 12/25

## 2020-12-26 LAB — SARS-COV-2 RNA SPEC QL NAA+PROBE: DETECTED

## 2020-12-26 PROCEDURE — 99232 SBSQ HOSP IP/OBS MODERATE 35: CPT | Mod: CS

## 2020-12-26 PROCEDURE — 99231 SBSQ HOSP IP/OBS SF/LOW 25: CPT

## 2020-12-26 RX ADMIN — PANTOPRAZOLE SODIUM 40 MILLIGRAM(S): 20 TABLET, DELAYED RELEASE ORAL at 09:34

## 2020-12-26 RX ADMIN — NYSTATIN CREAM 1 APPLICATION(S): 100000 CREAM TOPICAL at 21:37

## 2020-12-26 RX ADMIN — NYSTATIN CREAM 1 APPLICATION(S): 100000 CREAM TOPICAL at 09:34

## 2020-12-26 RX ADMIN — ATORVASTATIN CALCIUM 20 MILLIGRAM(S): 80 TABLET, FILM COATED ORAL at 21:37

## 2020-12-26 RX ADMIN — TAMSULOSIN HYDROCHLORIDE 0.4 MILLIGRAM(S): 0.4 CAPSULE ORAL at 21:37

## 2020-12-26 RX ADMIN — FINASTERIDE 5 MILLIGRAM(S): 5 TABLET, FILM COATED ORAL at 09:34

## 2020-12-26 RX ADMIN — SERTRALINE 50 MILLIGRAM(S): 25 TABLET, FILM COATED ORAL at 09:34

## 2020-12-26 NOTE — PROGRESS NOTE ADULT - ASSESSMENT
88 y old male on A/C fell off his bed, c/O left arm pain, no LOC, found to have IVH. SAH. Possible left epicondylar fracture. COVID positive.    # acute subdural hematoma and intraventricular hemorrhage S/p fall --- stable   - MRI as above  - no neuro deficits   - off coumadin    - neurosurgery no interventions   - PT eval/ OOB / requires bartolo lift and full assist OOB     #COVID positive --- acute illness resolved  - tested negative today  - retest 12/26  - s/p dexamethasone     #UTI  - asymptomatic UTI no need for abx at this time.     #Afib  - Rate controlled for now  - no AC at this time given SAH and IVH     # r/o LUE fracture  - degenerative changes without acute fracture.     dispo: pending sub acute rehab placement , requires 2 negative swabs. tested negative yesterday but positive today,   covid + 12/10 -  12/13 - 12/16  - 12/19 - 12/22

## 2020-12-27 PROCEDURE — 99232 SBSQ HOSP IP/OBS MODERATE 35: CPT | Mod: CS

## 2020-12-27 PROCEDURE — 99231 SBSQ HOSP IP/OBS SF/LOW 25: CPT

## 2020-12-27 RX ADMIN — FINASTERIDE 5 MILLIGRAM(S): 5 TABLET, FILM COATED ORAL at 09:14

## 2020-12-27 RX ADMIN — ATORVASTATIN CALCIUM 20 MILLIGRAM(S): 80 TABLET, FILM COATED ORAL at 21:41

## 2020-12-27 RX ADMIN — SERTRALINE 50 MILLIGRAM(S): 25 TABLET, FILM COATED ORAL at 09:16

## 2020-12-27 RX ADMIN — Medication 650 MILLIGRAM(S): at 22:44

## 2020-12-27 RX ADMIN — NYSTATIN CREAM 1 APPLICATION(S): 100000 CREAM TOPICAL at 09:16

## 2020-12-27 RX ADMIN — PANTOPRAZOLE SODIUM 40 MILLIGRAM(S): 20 TABLET, DELAYED RELEASE ORAL at 09:15

## 2020-12-27 RX ADMIN — Medication 650 MILLIGRAM(S): at 01:16

## 2020-12-27 RX ADMIN — TAMSULOSIN HYDROCHLORIDE 0.4 MILLIGRAM(S): 0.4 CAPSULE ORAL at 21:41

## 2020-12-27 RX ADMIN — Medication 650 MILLIGRAM(S): at 21:41

## 2020-12-27 RX ADMIN — NYSTATIN CREAM 1 APPLICATION(S): 100000 CREAM TOPICAL at 22:12

## 2020-12-27 NOTE — PROGRESS NOTE ADULT - ASSESSMENT
88 y old male on A/C fell off his bed, c/O left arm pain, no LOC, found to have IVH. SAH. Possible left epicondylar fracture. COVID positive.    # acute subdural hematoma and intraventricular hemorrhage S/p fall --- stable   - MRI as above  - no neuro deficits   - off coumadin    - neurosurgery no interventions   - PT eval/ OOB / requires bartolo lift and full assist OOB     #COVID positive --- acute illness resolved  - tested negative today  - retest 12/26  - s/p dexamethasone     #UTI  - asymptomatic UTI no need for abx at this time.     #Afib  - Rate controlled for now  - no AC at this time given SAH and IVH     # r/o LUE fracture  - degenerative changes without acute fracture.     dispo: pending sub acute rehab placement , requires 2 negative swabs. tested negative yesterday but positive today,   covid + 12/10 -  12/13 - 12/16  - 12/19 - 12/22 and 12/26/20

## 2020-12-28 PROCEDURE — 99232 SBSQ HOSP IP/OBS MODERATE 35: CPT | Mod: CS

## 2020-12-28 RX ADMIN — PANTOPRAZOLE SODIUM 40 MILLIGRAM(S): 20 TABLET, DELAYED RELEASE ORAL at 12:43

## 2020-12-28 RX ADMIN — NYSTATIN CREAM 1 APPLICATION(S): 100000 CREAM TOPICAL at 11:42

## 2020-12-28 RX ADMIN — NYSTATIN CREAM 1 APPLICATION(S): 100000 CREAM TOPICAL at 22:34

## 2020-12-28 RX ADMIN — FINASTERIDE 5 MILLIGRAM(S): 5 TABLET, FILM COATED ORAL at 11:41

## 2020-12-28 RX ADMIN — TAMSULOSIN HYDROCHLORIDE 0.4 MILLIGRAM(S): 0.4 CAPSULE ORAL at 22:33

## 2020-12-28 RX ADMIN — SERTRALINE 50 MILLIGRAM(S): 25 TABLET, FILM COATED ORAL at 11:41

## 2020-12-28 RX ADMIN — ATORVASTATIN CALCIUM 20 MILLIGRAM(S): 80 TABLET, FILM COATED ORAL at 22:34

## 2020-12-28 NOTE — PROGRESS NOTE ADULT - ASSESSMENT
88 y old male on A/C fell off his bed, c/O left arm pain, no LOC, found to have IVH. SAH. Possible left epicondylar fracture. COVID positive.    # acute subdural hematoma and intraventricular hemorrhage S/p fall --- stable   - no neuro deficits   - off coumadin    - neurosurgery no interventions   - PT eval/ OOB / requires bartolo lift and full assist OOB     #COVID positive --- acute illness resolved  - retest 12/29  - s/p dexamethasone     #UTI  - asymptomatic UTI no need for abx at this time.     #Afib  - Rate controlled for now  - no AC at this time given SAH and IVH     # r/o LUE fracture  - degenerative changes without acute fracture.     dispo: pending sub acute rehab placement , requires 2 negative swabs.   covid + 12/10 -  12/13 - 12/16  - 12/19 - 12/22, 12/26 retest 12/29

## 2020-12-29 LAB — SARS-COV-2 RNA SPEC QL NAA+PROBE: DETECTED

## 2020-12-29 PROCEDURE — 99232 SBSQ HOSP IP/OBS MODERATE 35: CPT | Mod: CS

## 2020-12-29 RX ORDER — HALOPERIDOL DECANOATE 100 MG/ML
1 INJECTION INTRAMUSCULAR ONCE
Refills: 0 | Status: COMPLETED | OUTPATIENT
Start: 2020-12-29 | End: 2020-12-29

## 2020-12-29 RX ADMIN — FINASTERIDE 5 MILLIGRAM(S): 5 TABLET, FILM COATED ORAL at 11:05

## 2020-12-29 RX ADMIN — HALOPERIDOL DECANOATE 1 MILLIGRAM(S): 100 INJECTION INTRAMUSCULAR at 19:16

## 2020-12-29 RX ADMIN — SERTRALINE 50 MILLIGRAM(S): 25 TABLET, FILM COATED ORAL at 11:05

## 2020-12-29 RX ADMIN — ATORVASTATIN CALCIUM 20 MILLIGRAM(S): 80 TABLET, FILM COATED ORAL at 22:17

## 2020-12-29 RX ADMIN — PANTOPRAZOLE SODIUM 40 MILLIGRAM(S): 20 TABLET, DELAYED RELEASE ORAL at 11:05

## 2020-12-29 RX ADMIN — TAMSULOSIN HYDROCHLORIDE 0.4 MILLIGRAM(S): 0.4 CAPSULE ORAL at 22:17

## 2020-12-29 RX ADMIN — NYSTATIN CREAM 1 APPLICATION(S): 100000 CREAM TOPICAL at 11:05

## 2020-12-29 RX ADMIN — NYSTATIN CREAM 1 APPLICATION(S): 100000 CREAM TOPICAL at 22:17

## 2020-12-29 NOTE — PROGRESS NOTE ADULT - ASSESSMENT
88 y old male on A/C fell off his bed, c/O left arm pain, no LOC, found to have IVH. SAH. Possible left epicondylar fracture. COVID positive.    # acute subdural hematoma and intraventricular hemorrhage S/p fall --- stable   - no neuro deficits   - off coumadin    - neurosurgery no interventions   - PT eval/ OOB / requires bartolo lift and full assist OOB     #COVID positive --- acute illness resolved  - retest 12/29  - s/p dexamethasone     #UTI  - asymptomatic UTI no need for abx at this time.     #Afib  - Rate controlled for now  - no AC at this time given SAH and IVH     # r/o LUE fracture  - degenerative changes without acute fracture.     dispo: pending sub acute rehab placement , requires 2 negative swabs.   covid + 12/10 -  12/13 - 12/16  - 12/19 - 12/22, 12/26 , 12/29 recheck 12/1

## 2020-12-30 PROCEDURE — 99232 SBSQ HOSP IP/OBS MODERATE 35: CPT | Mod: CS

## 2020-12-30 RX ADMIN — ATORVASTATIN CALCIUM 20 MILLIGRAM(S): 80 TABLET, FILM COATED ORAL at 21:34

## 2020-12-30 RX ADMIN — TAMSULOSIN HYDROCHLORIDE 0.4 MILLIGRAM(S): 0.4 CAPSULE ORAL at 21:33

## 2020-12-30 RX ADMIN — NYSTATIN CREAM 1 APPLICATION(S): 100000 CREAM TOPICAL at 21:34

## 2020-12-30 RX ADMIN — PANTOPRAZOLE SODIUM 40 MILLIGRAM(S): 20 TABLET, DELAYED RELEASE ORAL at 11:09

## 2020-12-30 RX ADMIN — NYSTATIN CREAM 1 APPLICATION(S): 100000 CREAM TOPICAL at 11:09

## 2020-12-30 RX ADMIN — SERTRALINE 50 MILLIGRAM(S): 25 TABLET, FILM COATED ORAL at 11:08

## 2020-12-30 RX ADMIN — FINASTERIDE 5 MILLIGRAM(S): 5 TABLET, FILM COATED ORAL at 11:09

## 2020-12-31 PROCEDURE — 99231 SBSQ HOSP IP/OBS SF/LOW 25: CPT

## 2020-12-31 PROCEDURE — 99232 SBSQ HOSP IP/OBS MODERATE 35: CPT | Mod: CS

## 2020-12-31 RX ADMIN — TAMSULOSIN HYDROCHLORIDE 0.4 MILLIGRAM(S): 0.4 CAPSULE ORAL at 22:07

## 2020-12-31 RX ADMIN — PANTOPRAZOLE SODIUM 40 MILLIGRAM(S): 20 TABLET, DELAYED RELEASE ORAL at 09:31

## 2020-12-31 RX ADMIN — NYSTATIN CREAM 1 APPLICATION(S): 100000 CREAM TOPICAL at 22:09

## 2020-12-31 RX ADMIN — FINASTERIDE 5 MILLIGRAM(S): 5 TABLET, FILM COATED ORAL at 09:31

## 2020-12-31 RX ADMIN — ATORVASTATIN CALCIUM 20 MILLIGRAM(S): 80 TABLET, FILM COATED ORAL at 22:07

## 2020-12-31 RX ADMIN — SERTRALINE 50 MILLIGRAM(S): 25 TABLET, FILM COATED ORAL at 09:31

## 2020-12-31 RX ADMIN — NYSTATIN CREAM 1 APPLICATION(S): 100000 CREAM TOPICAL at 09:31

## 2020-12-31 NOTE — PROGRESS NOTE ADULT - PROBLEM SELECTOR PROBLEM 3
Contusion of left elbow, subsequent encounter

## 2020-12-31 NOTE — PROGRESS NOTE ADULT - PROBLEM SELECTOR PROBLEM 2
SAH (subarachnoid hemorrhage)

## 2020-12-31 NOTE — PROGRESS NOTE ADULT - PROBLEM SELECTOR PROBLEM 1
Intraventricular hemorrhage

## 2020-12-31 NOTE — PROGRESS NOTE ADULT - ASSESSMENT
A/P:  SAH, stable  IVH, stable  Closed head injury  Neurosurgery on consult, no intervention  Hospitalist on service for medical management  Monitor neurochecks, stable  Fall risk precautions  GI/DVT prophylaxis  Pain control  F/U labs  Cont current care and meds  SS for d/c planning, awaiting COVID placement  Pt stable and cleared from trauma surgical standpoint, ALC  Pt aware of and agrees with all of the above

## 2021-01-01 LAB — SARS-COV-2 RNA SPEC QL NAA+PROBE: SIGNIFICANT CHANGE UP

## 2021-01-01 PROCEDURE — 99232 SBSQ HOSP IP/OBS MODERATE 35: CPT | Mod: CS

## 2021-01-01 RX ADMIN — ATORVASTATIN CALCIUM 20 MILLIGRAM(S): 80 TABLET, FILM COATED ORAL at 21:54

## 2021-01-01 RX ADMIN — TAMSULOSIN HYDROCHLORIDE 0.4 MILLIGRAM(S): 0.4 CAPSULE ORAL at 21:54

## 2021-01-01 RX ADMIN — FINASTERIDE 5 MILLIGRAM(S): 5 TABLET, FILM COATED ORAL at 09:41

## 2021-01-01 RX ADMIN — NYSTATIN CREAM 1 APPLICATION(S): 100000 CREAM TOPICAL at 21:55

## 2021-01-01 RX ADMIN — NYSTATIN CREAM 1 APPLICATION(S): 100000 CREAM TOPICAL at 09:41

## 2021-01-01 RX ADMIN — SERTRALINE 50 MILLIGRAM(S): 25 TABLET, FILM COATED ORAL at 09:41

## 2021-01-01 RX ADMIN — PANTOPRAZOLE SODIUM 40 MILLIGRAM(S): 20 TABLET, DELAYED RELEASE ORAL at 09:41

## 2021-01-02 LAB — SARS-COV-2 RNA SPEC QL NAA+PROBE: DETECTED

## 2021-01-02 PROCEDURE — 99232 SBSQ HOSP IP/OBS MODERATE 35: CPT | Mod: CS

## 2021-01-02 RX ADMIN — FINASTERIDE 5 MILLIGRAM(S): 5 TABLET, FILM COATED ORAL at 11:52

## 2021-01-02 RX ADMIN — TAMSULOSIN HYDROCHLORIDE 0.4 MILLIGRAM(S): 0.4 CAPSULE ORAL at 21:53

## 2021-01-02 RX ADMIN — SERTRALINE 50 MILLIGRAM(S): 25 TABLET, FILM COATED ORAL at 11:56

## 2021-01-02 RX ADMIN — ATORVASTATIN CALCIUM 20 MILLIGRAM(S): 80 TABLET, FILM COATED ORAL at 21:53

## 2021-01-02 RX ADMIN — NYSTATIN CREAM 1 APPLICATION(S): 100000 CREAM TOPICAL at 11:52

## 2021-01-02 RX ADMIN — PANTOPRAZOLE SODIUM 40 MILLIGRAM(S): 20 TABLET, DELAYED RELEASE ORAL at 11:56

## 2021-01-02 RX ADMIN — NYSTATIN CREAM 1 APPLICATION(S): 100000 CREAM TOPICAL at 21:54

## 2021-01-03 PROCEDURE — 99232 SBSQ HOSP IP/OBS MODERATE 35: CPT | Mod: CS

## 2021-01-03 RX ADMIN — FINASTERIDE 5 MILLIGRAM(S): 5 TABLET, FILM COATED ORAL at 12:10

## 2021-01-03 RX ADMIN — SERTRALINE 50 MILLIGRAM(S): 25 TABLET, FILM COATED ORAL at 12:07

## 2021-01-03 RX ADMIN — ATORVASTATIN CALCIUM 20 MILLIGRAM(S): 80 TABLET, FILM COATED ORAL at 22:29

## 2021-01-03 RX ADMIN — TAMSULOSIN HYDROCHLORIDE 0.4 MILLIGRAM(S): 0.4 CAPSULE ORAL at 22:29

## 2021-01-03 RX ADMIN — PANTOPRAZOLE SODIUM 40 MILLIGRAM(S): 20 TABLET, DELAYED RELEASE ORAL at 12:10

## 2021-01-03 RX ADMIN — NYSTATIN CREAM 1 APPLICATION(S): 100000 CREAM TOPICAL at 22:29

## 2021-01-03 RX ADMIN — NYSTATIN CREAM 1 APPLICATION(S): 100000 CREAM TOPICAL at 12:10

## 2021-01-04 PROCEDURE — 99232 SBSQ HOSP IP/OBS MODERATE 35: CPT | Mod: CS

## 2021-01-04 RX ORDER — DIPHENHYDRAMINE HCL 50 MG
12.5 CAPSULE ORAL ONCE
Refills: 0 | Status: COMPLETED | OUTPATIENT
Start: 2021-01-04 | End: 2021-01-04

## 2021-01-04 RX ADMIN — TAMSULOSIN HYDROCHLORIDE 0.4 MILLIGRAM(S): 0.4 CAPSULE ORAL at 21:07

## 2021-01-04 RX ADMIN — Medication 12.5 MILLIGRAM(S): at 01:13

## 2021-01-04 RX ADMIN — SERTRALINE 50 MILLIGRAM(S): 25 TABLET, FILM COATED ORAL at 10:17

## 2021-01-04 RX ADMIN — FINASTERIDE 5 MILLIGRAM(S): 5 TABLET, FILM COATED ORAL at 10:17

## 2021-01-04 RX ADMIN — NYSTATIN CREAM 1 APPLICATION(S): 100000 CREAM TOPICAL at 10:17

## 2021-01-04 RX ADMIN — NYSTATIN CREAM 1 APPLICATION(S): 100000 CREAM TOPICAL at 21:07

## 2021-01-04 RX ADMIN — ATORVASTATIN CALCIUM 20 MILLIGRAM(S): 80 TABLET, FILM COATED ORAL at 21:07

## 2021-01-04 RX ADMIN — PANTOPRAZOLE SODIUM 40 MILLIGRAM(S): 20 TABLET, DELAYED RELEASE ORAL at 10:17

## 2021-01-05 LAB — SARS-COV-2 RNA SPEC QL NAA+PROBE: DETECTED

## 2021-01-05 PROCEDURE — 99232 SBSQ HOSP IP/OBS MODERATE 35: CPT | Mod: CS

## 2021-01-05 PROCEDURE — 99231 SBSQ HOSP IP/OBS SF/LOW 25: CPT | Mod: CS

## 2021-01-05 RX ADMIN — PANTOPRAZOLE SODIUM 40 MILLIGRAM(S): 20 TABLET, DELAYED RELEASE ORAL at 11:00

## 2021-01-05 RX ADMIN — NYSTATIN CREAM 1 APPLICATION(S): 100000 CREAM TOPICAL at 10:59

## 2021-01-05 RX ADMIN — ATORVASTATIN CALCIUM 20 MILLIGRAM(S): 80 TABLET, FILM COATED ORAL at 20:55

## 2021-01-05 RX ADMIN — FINASTERIDE 5 MILLIGRAM(S): 5 TABLET, FILM COATED ORAL at 10:59

## 2021-01-05 RX ADMIN — SERTRALINE 50 MILLIGRAM(S): 25 TABLET, FILM COATED ORAL at 11:00

## 2021-01-05 RX ADMIN — TAMSULOSIN HYDROCHLORIDE 0.4 MILLIGRAM(S): 0.4 CAPSULE ORAL at 20:55

## 2021-01-06 LAB — SARS-COV-2 RNA SPEC QL NAA+PROBE: DETECTED

## 2021-01-06 PROCEDURE — 99232 SBSQ HOSP IP/OBS MODERATE 35: CPT | Mod: CS

## 2021-01-06 RX ADMIN — ATORVASTATIN CALCIUM 20 MILLIGRAM(S): 80 TABLET, FILM COATED ORAL at 21:24

## 2021-01-06 RX ADMIN — FINASTERIDE 5 MILLIGRAM(S): 5 TABLET, FILM COATED ORAL at 09:53

## 2021-01-06 RX ADMIN — NYSTATIN CREAM 1 APPLICATION(S): 100000 CREAM TOPICAL at 09:53

## 2021-01-06 RX ADMIN — NYSTATIN CREAM 1 APPLICATION(S): 100000 CREAM TOPICAL at 22:35

## 2021-01-06 RX ADMIN — SERTRALINE 50 MILLIGRAM(S): 25 TABLET, FILM COATED ORAL at 09:53

## 2021-01-06 RX ADMIN — PANTOPRAZOLE SODIUM 40 MILLIGRAM(S): 20 TABLET, DELAYED RELEASE ORAL at 09:53

## 2021-01-06 RX ADMIN — TAMSULOSIN HYDROCHLORIDE 0.4 MILLIGRAM(S): 0.4 CAPSULE ORAL at 21:24

## 2021-01-06 NOTE — PROGRESS NOTE ADULT - REASON FOR ADMISSION
Fall
fall head trauma
fall, head injury
fall head injury
fall, head injury
Fall
s/p fall
ICH
fall head injury
fall, head injury
fall, head injury
fall head injury
fall, head injury
Fall
fall, head injury
Fall
closed head injury

## 2021-01-07 ENCOUNTER — TRANSCRIPTION ENCOUNTER (OUTPATIENT)
Age: 86
End: 2021-01-07

## 2021-01-07 VITALS — SYSTOLIC BLOOD PRESSURE: 134 MMHG | DIASTOLIC BLOOD PRESSURE: 72 MMHG | HEART RATE: 59 BPM | OXYGEN SATURATION: 94 %

## 2021-01-07 PROCEDURE — 99239 HOSP IP/OBS DSCHRG MGMT >30: CPT | Mod: CS

## 2021-01-07 PROCEDURE — 99231 SBSQ HOSP IP/OBS SF/LOW 25: CPT | Mod: CS

## 2021-01-07 RX ORDER — ACETAMINOPHEN 500 MG
2 TABLET ORAL
Qty: 0 | Refills: 0 | DISCHARGE
Start: 2021-01-07

## 2021-01-07 RX ORDER — WARFARIN SODIUM 2.5 MG/1
1 TABLET ORAL
Qty: 0 | Refills: 0 | DISCHARGE

## 2021-01-07 RX ADMIN — SERTRALINE 50 MILLIGRAM(S): 25 TABLET, FILM COATED ORAL at 10:06

## 2021-01-07 RX ADMIN — PANTOPRAZOLE SODIUM 40 MILLIGRAM(S): 20 TABLET, DELAYED RELEASE ORAL at 10:06

## 2021-01-07 RX ADMIN — FINASTERIDE 5 MILLIGRAM(S): 5 TABLET, FILM COATED ORAL at 10:06

## 2021-01-07 RX ADMIN — NYSTATIN CREAM 1 APPLICATION(S): 100000 CREAM TOPICAL at 10:06

## 2021-01-07 NOTE — DISCHARGE NOTE PROVIDER - NSDCCPCAREPLAN_GEN_ALL_CORE_FT
PRINCIPAL DISCHARGE DIAGNOSIS  Diagnosis: SAH (subarachnoid hemorrhage)  Assessment and Plan of Treatment: you were admitted to the hospital due to a fall which caused bleeding in your head. - it has been determined that you do not need further management or surgery to treat the bleeding in your head. - please do not take aspirin, plavix, warfarin or other blood thinners.  monitor for the following signs/symptoms and contact your primary care provider if they occur or go to the emergency room if they are severe.   - dizziness, headache, chest pain, palpitation, nausea, vomiting or fever.   - if you notice changes to your vision, blurry vision, weakness on one side or your speech is slurred.      SECONDARY DISCHARGE DIAGNOSES  Diagnosis: Intraventricular hemorrhage  Assessment and Plan of Treatment: you were admitted to the hospital due to a bleed in your brain after a fall. please take extra caution not to fall.   monitor for the following signs/symptoms and contact your primary care provider if they occur or go to the emergency room if they are severe.   - dizziness, headache, chest pain, palpitation, nausea, vomiting or fever.   - if you notice changes to your vision, blurry vision, weakness on one side or your speech is slurred.    Diagnosis: Supratherapeutic INR  Assessment and Plan of Treatment: you were found to have a high INR due to coumadin. do not take any further blood thinners or coumadin    Diagnosis: Fall, initial encounter  Assessment and Plan of Treatment: - It is important to understand your risk for falling. Talk with your health care provider about your risk and what you can do to lower it. -There are actions you can take at home to lower your risk.   - If you do have a serious fall, make sure you tell your health care provider. Falling once raises your risk for falling again.  - Serious injuries from falls are common.  Broken bones, such as hip fractures. Head injuries, such as traumatic brain injuries (TBI).  Fear of falling can also cause you to avoid activities and stay at home. This can make your muscles weaker and actually raise your risk for a fall.   - There are a number of risk factors that increase your risk for falling.   -Common risk factors include:   Weakness in the lower body.   Lack (deficiency) of vitamin D.   Being generally weak or confused due to long-term (chronic) illness.   Dizziness or balance problems.   Poor vision.   Medicines that cause dizziness or drowsiness. These can include medicines for your blood pressure, heart, anxiety, insomnia, or edema, as well as pain medicines and muscle relaxants.  Other risk factors include:   Drinking alcohol.   Having had a fall in the past.   Having depression.   Foot pain or improper footwear.   Working at a dangerous job.   Having any of the following in your home:   Tripping hazards, such as floor clutter or loose rugs.   Poor lighting.   Pets or clutter.  Dementia or memory loss.  **** Monitor for the following signs and symptoms and contact  you health care provider or go to the ER if they are severe  You are afraid of falling at home.   You feel weak, drowsy, or dizzy at home.   You fall at home.

## 2021-01-07 NOTE — DISCHARGE NOTE PROVIDER - NSDCMRMEDTOKEN_GEN_ALL_CORE_FT
acetaminophen 325 mg oral tablet: 2 tab(s) orally every 4 hours, As needed, Temp greater or equal to 38C (100.4F), Mild Pain (1 - 3)  finasteride 5 mg oral tablet: 1 tab(s) orally once a day  Klor-Con M20 oral tablet, extended release: 1 tab(s) orally 2 times a day  rosuvastatin 5 mg oral tablet: 1 tab(s) orally once a day  sertraline 50 mg oral tablet: 1 tab(s) orally once a day  tamsulosin 0.4 mg oral capsule: 1 cap(s) orally once a day

## 2021-01-07 NOTE — DISCHARGE NOTE PROVIDER - NSDCCAREPROVSEEN_GEN_ALL_CORE_FT
Doris Birmingham (nurse practitioner)   Phuc Guo (internal medicine)   Sahil Roblero (trauma surgery)   Vanna Borden (trauma surgery) 	  Yohan Gutierrez (internal medicine)

## 2021-01-07 NOTE — PROGRESS NOTE ADULT - SUBJECTIVE AND OBJECTIVE BOX
CC: Fall    · Subjective and Objective:   87 yo male brought to ER by EMS after fall at home. Pt states he was walking with walker and "slid" down to his buttock. Pt denies hitting his head. 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 Pt states he had been sliding down frequently. in ED ABC intact HD stabe. No headache no neck pain. no SOB, no fever. No chest or abdominal pain. Mild  left elbow pain abrasion no focal neurological complaint, Moves all extremities  He was noted to have old ecchymosis to left side of face and scalp when asked about it pt states he does not recall hitting his head a head Ct was ordering by the ER physician after his INR was found to be at 6.20. Poor historian, very [place]. (01 Dec 2020 10:00)    12/2: Pt seen and examined, awake, alert, fever better, no SOB, O2 sat WNL. Neurologically stable. Mild left elbow pain, Almost full range of motion.  12/3 no acutre events, CT elbow negative for fracture, still pending MRI of brain for neurosurgical evaluation.  12/4: pt evaled sitting up in bed. no overnight events. MRI scheduled for today   12/7: pt seen and examined sitting up in bed. no overnight events. denies pain or discomfort   12/9: pt seen and examined sitting up in bed while eating. denies pain or discomfort. no n/v. pending placement   12/10: pt seen and examined sitting up in bed. no pain or discomfort. covid tested positive today otherwise status quo   12/11: pt seen and examined sitting up in bed. no pain or discomfort noted by pt. pending retest on 12/13 12/12/20: Patient seen and examined. He denies any complaints. Waiting for rehab bed once COVID-19 negative.  12/13/20: COVID-19 positive today. No new issues  12/14: covid + yesterday. otherwise no complaints.   12/15: pt denies pain or discomfort today. no new complaints. covid retesting today   12/16: pt resting in bed comfortably. no new complaints. covid tested + yesterday   12/17: pt sitting up in bed after breakfast. no new complaints. awaiting covid retesting.   12/18: pt seen and examined sitting up in bed. no new complaints.   12/19: pt sitting up in bed. no pain or discomfort noted. pending negative covid testing.   12/21: pt seen and examined sitting up in bed, resting. no discomfort or pain. pending negative covid swab.   12/22: pt seen and examined in bed. confused per baseline. no discomfort or pain.   12/23: pt lying in bed, confused and asking for his wife. denies discomfort or pain.   12/24: no complaints. awaiting negative covid testing. retest 12/25 12/25: sitting up in bed eating breakfast. tested negative today. retest tonight.   12/26/20: No new issues. COVID positive today  12/27/20: No new complaints.  12/28: sitting up, interactive. covid testing tonight   12/29: sitting up in bed. covid + this AM.   12/30/20: Looks better today. No new issues. Calm and relaxed.   12/31/20: No new complaints.   1/1: No new events. Status quo.  1/2: Lying in bed, comfortable. Eating food.  No new events.   1/3: Comfortable. No overnight events. Awaiting neg screen.  1/4: No new events. Status quo.     REVIEW OF SYSTEMS: All other review of systems is negative unless indicated above.    Vital Signs Last 24 Hrs  T(C): 36.3 (04 Jan 2021 07:53), Max: 36.4 (03 Jan 2021 16:24)  T(F): 97.3 (04 Jan 2021 07:53), Max: 97.6 (03 Jan 2021 16:24)  HR: 63 (04 Jan 2021 07:53) (58 - 63)  BP: 113/62 (04 Jan 2021 07:53) (113/62 - 135/84)  BP(mean): --  RR: 18 (04 Jan 2021 07:53) (18 - 18)  SpO2: 93% (04 Jan 2021 07:53) (93% - 95%)    PHYSICAL EXAM:    HEENT:  pupils equal and reactive, EOMI, no oropharyngeal lesions, erythema, exudates, oral thrush, scant left orbital hematoma   NECK:   supple, no carotid bruits, no palpable lymph nodes, no thyromegaly  CV:  +S1, +S2, regular, no murmurs or rubs  RESP:   lungs clear to auscultation bilaterally, no wheezing, rales, rhonchi, good air entry bilaterally  GI:  abdomen soft, non-tender, non-distended, normal BS, no bruits, no abdominal masses, no palpable masses  MSK:   normal muscle tone, no atrophy, no rigidity, no contractions  EXT:  no clubbing, no cyanosis, no edema, no calf pain, swelling or erythema. 5/5 strength   VASCULAR:  pulses equal and symmetric in the upper and lower extremities  NEURO:  AAOX2, no gross focal neurological deficits, follows commands, able to move extremities spontaneously  SKIN:  no ulcers, lesions. incontinence rash noted to perineum     MEDICATIONS  (STANDING):  atorvastatin 20 milliGRAM(s) Oral at bedtime  finasteride 5 milliGRAM(s) Oral daily  nystatin Powder 1 Application(s) Topical two times a day  pantoprazole    Tablet 40 milliGRAM(s) Oral daily  sertraline 50 milliGRAM(s) Oral daily  tamsulosin 0.4 milliGRAM(s) Oral at bedtime    MEDICATIONS  (PRN):  acetaminophen   Tablet .. 650 milliGRAM(s) Oral every 4 hours PRN Temp greater or equal to 38C (100.4F), Mild Pain (1 - 3)  ondansetron Injectable 4 milliGRAM(s) IV Push every 6 hours PRN Nausea        Assessment and Plan:  88 y old male on A/C fell off his bed, c/O left arm pain, no LOC, found to have IVH. SAH. Possible left epicondylar fracture. COVID positive.    # acute subdural hematoma and intraventricular hemorrhage S/p fall --- stable   - no neuro deficits   - off coumadin    - neurosurgery no interventions   - PT eval/ OOB / requires bartolo lift and full assist OOB     #COVID positive --- acute illness resolved  - retest 12/29  - s/p dexamethasone     #UTI  - asymptomatic UTI no need for abx at this time.     #Afib  - Rate controlled for now  - no AC at this time given SAH and IVH     # r/o LUE fracture  - degenerative changes without acute fracture.     dispo: pending sub acute rehab placement , requires 2 negative swabs.   neg COVID 1/1  pos COVID 1/2           
87 yo male brought to ER by EMS after fall at home. Pt states he was walking with walker and "slid" down to his buttock. Pt denies hitting his head. 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 Pt states he had been sliding down frequently. in ED ABC intact HD stabe. No headache no neck pain. no SOB, no fever. No chest or abdominal pain. Mild  left elbow pain abrasion no focal neurological complaint, Moves all extremities  He was noted to have old ecchymosis to left side of face and scalp when asked about it pt states he does not recall hitting his head a head Ct was ordering by the ER physician after his INR was found to be at 6.20. Poor historian, very [place]. (01 Dec 2020 10:00)    12/2: Pt seen and examined, awake, alert, fever better, no SOB, O2 sat WNL. Neurologically stable. Mild left elbow pain, Almost full range of motion.  12/3 no acutre events, CT elbow negative for fracture, still pending MRI of brain for neurosurgical evaluation.  12/4: pt evaled sitting up in bed. no overnight events. MRI scheduled for today   12/7: pt seen and examined sitting up in bed. no overnight events. denies pain or discomfort   12/9: pt seen and examined sitting up in bed while eating. denies pain or discomfort. no n/v. pending placement   12/10: pt seen and examined sitting up in bed. no pain or discomfort. covid tested positive today otherwise status quo   12/11: pt seen and examined sitting up in bed. no pain or discomfort noted by pt. pending retest on 12/13 12/12/20: Patient seen and examined. He denies any complaints. Waiting for rehab bed once COVID-19 negative.  12/13/20: COVID-19 positive today. No new issues  12/14: covid + yesterday. otherwise no complaints.   12/15: pt denies pain or discomfort today. no new complaints. covid retesting today   12/16: pt resting in bed comfortably. no new complaints. covid tested + yesterday       Vital Signs Last 24 Hrs  T(C): 36.4 (16 Dec 2020 08:16), Max: 36.8 (15 Dec 2020 16:03)  T(F): 97.6 (16 Dec 2020 08:16), Max: 98.2 (15 Dec 2020 16:03)  HR: 72 (16 Dec 2020 08:16) (72 - 77)  BP: 137/69 (16 Dec 2020 08:16) (121/72 - 137/69)  BP(mean): --  RR: 18 (16 Dec 2020 08:16) (18 - 18)  SpO2: 92% (16 Dec 2020 08:16) (92% - 93%) ---- room air       PHYSICAL EXAM:    HEENT:  pupils equal and reactive, EOMI, no oropharyngeal lesions, erythema, exudates, oral thrush, left orbital hematoma   NECK:   supple, no carotid bruits, no palpable lymph nodes, no thyromegaly  CV:  +S1, +S2, regular, no murmurs or rubs  RESP:   lungs clear to auscultation bilaterally, no wheezing, rales, rhonchi, good air entry bilaterally  GI:  abdomen soft, non-tender, non-distended, normal BS, no bruits, no abdominal masses, no palpable masses  MSK:   normal muscle tone, no atrophy, no rigidity, no contractions  EXT:  no clubbing, no cyanosis, no edema, no calf pain, swelling or erythema. 5/5 strength   VASCULAR:  pulses equal and symmetric in the upper and lower extremities  NEURO:  AAOX2, no gross focal neurological deficits, follows commands, able to move extremities spontaneously  SKIN:  no ulcers, lesions or rashes        MEDICATIONS  (STANDING):  atorvastatin 20 milliGRAM(s) Oral at bedtime  finasteride 5 milliGRAM(s) Oral daily  pantoprazole    Tablet 40 milliGRAM(s) Oral daily  sertraline 50 milliGRAM(s) Oral daily  tamsulosin 0.4 milliGRAM(s) Oral at bedtime    MEDICATIONS  (PRN):  acetaminophen   Tablet .. 650 milliGRAM(s) Oral every 4 hours PRN Temp greater or equal to 38C (100.4F), Mild Pain (1 - 3)  ondansetron Injectable 4 milliGRAM(s) IV Push every 6 hours PRN Nausea    
87 yo male brought to ER by EMS after fall at home. Pt states he was walking with walker and "slid" down to his buttock. Pt denies hitting his head. 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 Pt states he had been sliding down frequently. in ED ABC intact HD stabe. No headache no neck pain. no SOB, no fever. No chest or abdominal pain. Mild  left elbow pain abrasion no focal neurological complaint, Moves all extremities  He was noted to have old ecchymosis to left side of face and scalp when asked about it pt states he does not recall hitting his head a head Ct was ordering by the ER physician after his INR was found to be at 6.20. Poor historian, very [place]. (01 Dec 2020 10:00)    12/2: Pt seen and examined, awake, alert, fever better, no SOB, O2 sat WNL. Neurologically stable. Mild left elbow pain, Almost full range of motion.  12/3 no acutre events, CT elbow negative for fracture, still pending MRI of brain for neurosurgical evaluation.  12/4: pt evaled sitting up in bed. no overnight events. MRI scheduled for today   12/7: pt seen and examined sitting up in bed. no overnight events. denies pain or discomfort   12/9: pt seen and examined sitting up in bed while eating. denies pain or discomfort. no n/v. pending placement   12/10: pt seen and examined sitting up in bed. no pain or discomfort. covid tested positive today otherwise status quo   12/11: pt seen and examined sitting up in bed. no pain or discomfort noted by pt. pending retest on 12/13     Vital Signs Last 24 Hrs  T(C): 36.2 (11 Dec 2020 09:07), Max: 36.3 (10 Dec 2020 17:39)  T(F): 97.2 (11 Dec 2020 09:07), Max: 97.3 (10 Dec 2020 17:39)  HR: 68 (11 Dec 2020 09:07) (68 - 79)  BP: 149/85 (11 Dec 2020 09:07) (133/87 - 149/85)  BP(mean): --  RR: 18 (11 Dec 2020 09:07) (18 - 18)  SpO2: 97% (11 Dec 2020 09:07) (95% - 97%)---- room air     MEDICATIONS  (STANDING):  atorvastatin 20 milliGRAM(s) Oral at bedtime  finasteride 5 milliGRAM(s) Oral daily  pantoprazole    Tablet 40 milliGRAM(s) Oral daily  sertraline 50 milliGRAM(s) Oral daily  tamsulosin 0.4 milliGRAM(s) Oral at bedtime    MEDICATIONS  (PRN):  acetaminophen   Tablet .. 650 milliGRAM(s) Oral every 4 hours PRN Temp greater or equal to 38C (100.4F), Mild Pain (1 - 3)  ondansetron Injectable 4 milliGRAM(s) IV Push every 6 hours PRN Nausea      labs reviewed     < from: MR Head No Cont Disc (12.04.20 @ 23:31) >  IMPRESSION:  Limited incomplete study.  Small acute infarct in the right frontal lobe measuring 2 mm.  Left quadrigeminal plate cistern subarachnoid hemorrhage. Possible thin bilateral frontal convexity subdural hemorrhages. Consider follow-up MRI with anesthesia sedation and/or follow-up noncontrast head CT.  No herniation pattern.  Preliminary report provided by [facility] Dr. Zac Krueger DO. The findings were verbally communicated via telephone conference with MARIBEL Garcia at 12:06 AM EST on 12/5/2020. The findings were acknowledged and understood.          DEMAR HAYS MD; Attending Radiologist  This document has been electronically signed. Dec  5 2020  9:46AM    < end of copied text >  
87 yo male brought to ER by EMS after fall at home. Pt states he was walking with walker and "slid" down to his buttock. Pt denies hitting his head. 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 Pt states he had been sliding down frequently. in ED ABC intact HD stabe. No headache no neck pain. no SOB, no fever. No chest or abdominal pain. Mild  left elbow pain abrasion no focal neurological complaint, Moves all extremities  He was noted to have old ecchymosis to left side of face and scalp when asked about it pt states he does not recall hitting his head a head Ct was ordering by the ER physician after his INR was found to be at 6.20. Poor historian, very [place]. (01 Dec 2020 10:00)    12/2: Pt seen and examined, awake, alert, fever better, no SOB, O2 sat WNL. Neurologically stable. Mild left elbow pain, Almost full range of motion.  12/3 no acutre events, CT elbow negative for fracture, still pending MRI of brain for neurosurgical evaluation.  12/4: pt evaled sitting up in bed. no overnight events. MRI scheduled for today   12/7: pt seen and examined sitting up in bed. no overnight events. denies pain or discomfort     Vital Signs Last 24 Hrs  T(C): 36.3 (07 Dec 2020 08:22), Max: 36.6 (06 Dec 2020 23:26)  T(F): 97.4 (07 Dec 2020 08:22), Max: 97.9 (06 Dec 2020 23:26)  HR: 86 (07 Dec 2020 08:22) (68 - 86)  BP: 132/85 (07 Dec 2020 08:22) (132/85 - 155/78)  BP(mean): --  RR: 16 (07 Dec 2020 08:22) (16 - 16)  SpO2: 97% (07 Dec 2020 08:22) (95% - 97%)    MEDICATIONS  (STANDING):  atorvastatin 20 milliGRAM(s) Oral at bedtime  finasteride 5 milliGRAM(s) Oral daily  pantoprazole    Tablet 40 milliGRAM(s) Oral daily  sertraline 50 milliGRAM(s) Oral daily  tamsulosin 0.4 milliGRAM(s) Oral at bedtime    MEDICATIONS  (PRN):  acetaminophen   Tablet .. 650 milliGRAM(s) Oral every 4 hours PRN Temp greater or equal to 38C (100.4F), Mild Pain (1 - 3)  ondansetron Injectable 4 milliGRAM(s) IV Push every 6 hours PRN Nausea      labs reviewed     < from: MR Head No Cont Disc (12.04.20 @ 23:31) >  IMPRESSION:  Limited incomplete study.  Small acute infarct in the right frontal lobe measuring 2 mm.  Left quadrigeminal plate cistern subarachnoid hemorrhage. Possible thin bilateral frontal convexity subdural hemorrhages. Consider follow-up MRI with anesthesia sedation and/or follow-up noncontrast head CT.  No herniation pattern.  Preliminary report provided by [facility] Dr. Zac Krueger DO. The findings were verbally communicated via telephone conference with MARIBEL Garcia at 12:06 AM EST on 12/5/2020. The findings were acknowledged and understood.          DEMAR HAYS MD; Attending Radiologist  This document has been electronically signed. Dec  5 2020  9:46AM    < end of copied text >  
87 yo male brought to ER by EMS after fall at home. Pt states he was walking with walker and "slid" down to his buttock. Pt denies hitting his head. 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 Pt states he had been sliding down frequently. in ED ABC intact HD stabe. No headache no neck pain. no SOB, no fever. No chest or abdominal pain. Mild  left elbow pain abrasion no focal neurological complaint, Moves all extremities  He was noted to have old ecchymosis to left side of face and scalp when asked about it pt states he does not recall hitting his head a head Ct was ordering by the ER physician after his INR was found to be at 6.20. Poor historian, very [place]. (01 Dec 2020 10:00)    12/2: Pt seen and examined, awake, alert, fever better, no SOB, O2 sat WNL. Neurologically stable. Mild left elbow pain, Almost full range of motion.  12/3 no acutre events, CT elbow negative for fracture, still pending MRI of brain for neurosurgical evaluation.  12/4: pt evaled sitting up in bed. no overnight events. MRI scheduled for today   12/7: pt seen and examined sitting up in bed. no overnight events. denies pain or discomfort   12/9: pt seen and examined sitting up in bed while eating. denies pain or discomfort. no n/v. pending placement   12/10: pt seen and examined sitting up in bed. no pain or discomfort. covid tested positive today otherwise status quo   12/11: pt seen and examined sitting up in bed. no pain or discomfort noted by pt. pending retest on 12/13 12/12/20: Patient seen and examined. He denies any complaints. Waiting for rehab bed once COVID-19 negative.  12/13/20: COVID-19 positive today. No new issues  12/14: covid + yesterday. otherwise no complaints.   12/15: pt denies pain or discomfort today. no new complaints. covid retesting today     Vital Signs Last 24 Hrs  T(C): 36.3 (15 Dec 2020 08:51), Max: 36.4 (14 Dec 2020 21:00)  T(F): 97.3 (15 Dec 2020 08:51), Max: 97.6 (14 Dec 2020 21:00)  HR: 64 (15 Dec 2020 08:51) (64 - 74)  BP: 128/77 (15 Dec 2020 08:51) (107/64 - 128/77)  BP(mean): --  RR: 20 (15 Dec 2020 08:51) (18 - 20)  SpO2: 97% (15 Dec 2020 08:51) (96% - 97%) ---- room air       PHYSICAL EXAM:    HEENT:  pupils equal and reactive, EOMI, no oropharyngeal lesions, erythema, exudates, oral thrush, left orbital hematoma   NECK:   supple, no carotid bruits, no palpable lymph nodes, no thyromegaly  CV:  +S1, +S2, regular, no murmurs or rubs  RESP:   lungs clear to auscultation bilaterally, no wheezing, rales, rhonchi, good air entry bilaterally  GI:  abdomen soft, non-tender, non-distended, normal BS, no bruits, no abdominal masses, no palpable masses  MSK:   normal muscle tone, no atrophy, no rigidity, no contractions  EXT:  no clubbing, no cyanosis, no edema, no calf pain, swelling or erythema. 5/5 strength   VASCULAR:  pulses equal and symmetric in the upper and lower extremities  NEURO:  AAOX2, no gross focal neurological deficits, follows commands, able to move extremities spontaneously  SKIN:  no ulcers, lesions or rashes        MEDICATIONS  (STANDING):  atorvastatin 20 milliGRAM(s) Oral at bedtime  finasteride 5 milliGRAM(s) Oral daily  pantoprazole    Tablet 40 milliGRAM(s) Oral daily  sertraline 50 milliGRAM(s) Oral daily  tamsulosin 0.4 milliGRAM(s) Oral at bedtime    MEDICATIONS  (PRN):  acetaminophen   Tablet .. 650 milliGRAM(s) Oral every 4 hours PRN Temp greater or equal to 38C (100.4F), Mild Pain (1 - 3)  ondansetron Injectable 4 milliGRAM(s) IV Push every 6 hours PRN Nausea    
87 yo male brought to ER by EMS after fall at home. Pt states he was walking with walker and "slid" down to his buttock. Pt denies hitting his head. 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 Pt states he had been sliding down frequently. in ED ABC intact HD stabe. No headache no neck pain. no SOB, no fever. No chest or abdominal pain. Mild  left elbow pain abrasion no focal neurological complaint, Moves all extremities  He was noted to have old ecchymosis to left side of face and scalp when asked about it pt states he does not recall hitting his head a head Ct was ordering by the ER physician after his INR was found to be at 6.20. Poor historian, very [place]. (01 Dec 2020 10:00)    12/2: Pt seen and examined, awake, alert, fever better, no SOB, O2 sat WNL. Neurologically stable. Mild left elbow pain, Almost full range of motion.  12/3 no acutre events, CT elbow negative for fracture, still pending MRI of brain for neurosurgical evaluation.  12/4: pt evaled sitting up in bed. no overnight events. MRI scheduled for today   12/7: pt seen and examined sitting up in bed. no overnight events. denies pain or discomfort   12/9: pt seen and examined sitting up in bed while eating. denies pain or discomfort. no n/v. pending placement   12/10: pt seen and examined sitting up in bed. no pain or discomfort. covid tested positive today otherwise status quo   12/11: pt seen and examined sitting up in bed. no pain or discomfort noted by pt. pending retest on 12/13 12/12/20: Patient seen and examined. He denies any complaints. Waiting for rehab bed once COVID-19 negative.  12/13/20: COVID-19 positive today. No new issues    Vital Signs Last 24 Hrs  T(C): 35.5 (13 Dec 2020 08:18), Max: 36.4 (12 Dec 2020 21:21)  T(F): 95.9 (13 Dec 2020 08:18), Max: 97.5 (12 Dec 2020 21:21)  HR: 63 (13 Dec 2020 08:18) (63 - 73)  BP: 124/84 (13 Dec 2020 08:18) (120/71 - 124/84)  BP(mean): --  RR: 16 (13 Dec 2020 08:18) (16 - 18)  SpO2: 97% (13 Dec 2020 08:18) (94% - 97%)    PHYSICAL EXAM:  Constitutional: NAD, GCS: 15/15  AOX2  Eyes:  WNL  ENMT:  WNL  Neck:  WNL, non tender  Back: Non tender  Respiratory: CTABL  Cardiovascular:  S1+S2+0  Gastrointestinal: Soft, ND , NT  Genitourinary:  WNL  Extremities: NV intact, left arm contusion stable.  Vascular:  Intact  Neurological: No focal neurological deficit,  CN, motor and sensory system grossly intact.  Skin: WNL        MEDICATIONS  (STANDING):  atorvastatin 20 milliGRAM(s) Oral at bedtime  finasteride 5 milliGRAM(s) Oral daily  pantoprazole    Tablet 40 milliGRAM(s) Oral daily  sertraline 50 milliGRAM(s) Oral daily  tamsulosin 0.4 milliGRAM(s) Oral at bedtime    MEDICATIONS  (PRN):  acetaminophen   Tablet .. 650 milliGRAM(s) Oral every 4 hours PRN Temp greater or equal to 38C (100.4F), Mild Pain (1 - 3)  ondansetron Injectable 4 milliGRAM(s) IV Push every 6 hours PRN Nausea    
89 yo male brought to ER by EMS after fall at home. Pt states he was walking with walker and "slid" down to his buttock. Pt denies hitting his head. 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 Pt states he had been sliding down frequently. in ED ABC intact HD stabe. No headache no neck pain. no SOB, no fever. No chest or abdominal pain. Mild  left elbow pain abrasion no focal neurological complaint, Moves all extremities  He was noted to have old ecchymosis to left side of face and scalp when asked about it pt states he does not recall hitting his head a head Ct was ordering by the ER physician after his INR was found to be at 6.20. Poor historian, very [place]. (01 Dec 2020 10:00)    12/2: Pt seen and examined, awake, alert, fever better, no SOB, O2 sat WNL. Neurologically stable. Mild left elbow pain, Almost full range of motion.  12/3 no acutre events, CT elbow negative for fracture, still pending MRI of brain for neurosurgical evaluation.  12/4: pt evaled sitting up in bed. no overnight events. MRI scheduled for today   12/7: pt seen and examined sitting up in bed. no overnight events. denies pain or discomfort   12/9: pt seen and examined sitting up in bed while eating. denies pain or discomfort. no n/v. pending placement     Vital Signs Last 24 Hrs  T(C): 36.7 (09 Dec 2020 09:04), Max: 37.3 (08 Dec 2020 21:00)  T(F): 98.1 (09 Dec 2020 09:04), Max: 99.1 (08 Dec 2020 21:00)  HR: 78 (09 Dec 2020 09:04) (78 - 100)  BP: 121/68 (09 Dec 2020 09:04) (117/64 - 146/90)  BP(mean): --  RR: 18 (09 Dec 2020 09:04) (18 - 18)  SpO2: 93% (09 Dec 2020 09:04) (92% - 94%) --- room air     MEDICATIONS  (STANDING):  atorvastatin 20 milliGRAM(s) Oral at bedtime  finasteride 5 milliGRAM(s) Oral daily  pantoprazole    Tablet 40 milliGRAM(s) Oral daily  sertraline 50 milliGRAM(s) Oral daily  tamsulosin 0.4 milliGRAM(s) Oral at bedtime    MEDICATIONS  (PRN):  acetaminophen   Tablet .. 650 milliGRAM(s) Oral every 4 hours PRN Temp greater or equal to 38C (100.4F), Mild Pain (1 - 3)  ondansetron Injectable 4 milliGRAM(s) IV Push every 6 hours PRN Nausea      labs reviewed     < from: MR Head No Cont Disc (12.04.20 @ 23:31) >  IMPRESSION:  Limited incomplete study.  Small acute infarct in the right frontal lobe measuring 2 mm.  Left quadrigeminal plate cistern subarachnoid hemorrhage. Possible thin bilateral frontal convexity subdural hemorrhages. Consider follow-up MRI with anesthesia sedation and/or follow-up noncontrast head CT.  No herniation pattern.  Preliminary report provided by [facility] Dr. Zac Krueger DO. The findings were verbally communicated via telephone conference with MARIBEL Garcia at 12:06 AM EST on 12/5/2020. The findings were acknowledged and understood.          DEMAR HAYS MD; Attending Radiologist  This document has been electronically signed. Dec  5 2020  9:46AM    < end of copied text >  
89 yo male brought to ER by EMS after fall at home. Pt states he was walking with walker and "slid" down to his buttock. Pt denies hitting his head. 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 Pt states he had been sliding down frequently. in ED ABC intact HD stabe. No headache no neck pain. no SOB, no fever. No chest or abdominal pain. Mild  left elbow pain abrasion no focal neurological complaint, Moves all extremities  He was noted to have old ecchymosis to left side of face and scalp when asked about it pt states he does not recall hitting his head a head Ct was ordering by the ER physician after his INR was found to be at 6.20. Poor historian, very [place]. (01 Dec 2020 10:00)    12/2: Pt seen and examined, awake, alert, fever better, no SOB, O2 sat WNL. Neurologically stable. Mild left elbow pain, Almost full range of motion.  12/3 no acutre events, CT elbow negative for fracture, still pending MRI of brain for neurosurgical evaluation.  12/4: pt evaled sitting up in bed. no overnight events. MRI scheduled for today     Vital Signs Last 24 Hrs  T(C): 36.4 (04 Dec 2020 09:24), Max: 36.7 (03 Dec 2020 20:36)  T(F): 97.5 (04 Dec 2020 09:24), Max: 98 (03 Dec 2020 20:36)  HR: 90 (04 Dec 2020 09:24) (60 - 90)  BP: 166/83 (04 Dec 2020 09:24) (134/75 - 166/83)  BP(mean): --  RR: 21 (04 Dec 2020 09:24) (21 - 22)  SpO2: 95% (04 Dec 2020 09:24) (95% - 97%)    MEDICATIONS  (STANDING):  atorvastatin 20 milliGRAM(s) Oral at bedtime  finasteride 5 milliGRAM(s) Oral daily  pantoprazole    Tablet 40 milliGRAM(s) Oral daily  sertraline 50 milliGRAM(s) Oral daily  tamsulosin 0.4 milliGRAM(s) Oral at bedtime    MEDICATIONS  (PRN):  acetaminophen   Tablet .. 650 milliGRAM(s) Oral every 4 hours PRN Temp greater or equal to 38C (100.4F), Mild Pain (1 - 3)  ondansetron Injectable 4 milliGRAM(s) IV Push every 6 hours PRN Nausea      < from: CT Elbow No Cont, Left (12.03.20 @ 15:33) >  IMPRESSION:    No acute displaced fracture or dislocation is seen.    Mild subcutaneous stranding/swelling at the posterior aspect of the elbow.    Small osseous proliferative changes at the lateral aspect of the lateral humeral epicondyle, likely related to enthesopathy/degenerative change.    < end of copied text >    < from: CT Head No Cont (12.02.20 @ 10:10) >  IMPRESSION:    Reidentified is a 6 mm focus of hyperattenuation in the left ambient cistern versus the posterior lateral aspect of the left cerebral peduncle, may reflect a stable small focus of subarachnoid hemorrhage versus a cavernoma, unchanged. Very minimal layering hemorrhage in the lateral ventricles has decreased since prior exam. There are multiple chronic lacunar infarcts in the right basal ganglia.    Rest of the chronic changes as above.        < end of copied text >  
89 yo male brought to ER by EMS after fall at home. Pt states he was walking with walker and "slid" down to his buttock. Pt denies hitting his head. 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 Pt states he had been sliding down frequently. in ED ABC intact HD stabe. No headache no neck pain. no SOB, no fever. No chest or abdominal pain. Mild  left elbow pain abrasion no focal neurological complaint, Moves all extremities  He was noted to have old ecchymosis to left side of face and scalp when asked about it pt states he does not recall hitting his head a head Ct was ordering by the ER physician after his INR was found to be at 6.20. Poor historian, very [place]. (01 Dec 2020 10:00)    12/2: Pt seen and examined, awake, alert, fever better, no SOB, O2 sat WNL. Neurologically stable. Mild left elbow pain, Almost full range of motion.  12/3 no acutre events, CT elbow negative for fracture, still pending MRI of brain for neurosurgical evaluation.  12/4: pt evaled sitting up in bed. no overnight events. MRI scheduled for today   12/7: pt seen and examined sitting up in bed. no overnight events. denies pain or discomfort   12/9: pt seen and examined sitting up in bed while eating. denies pain or discomfort. no n/v. pending placement   12/10: pt seen and examined sitting up in bed. no pain or discomfort. covid tested positive today otherwise status quo     Vital Signs Last 24 Hrs  T(C): 36.1 (10 Dec 2020 08:25), Max: 36.6 (09 Dec 2020 22:12)  T(F): 97 (10 Dec 2020 08:25), Max: 97.9 (09 Dec 2020 22:12)  HR: 73 (10 Dec 2020 08:25) (73 - 77)  BP: 135/62 (10 Dec 2020 08:25) (116/67 - 138/83)  BP(mean): --  RR: 18 (10 Dec 2020 08:25) (18 - 18)  SpO2: 96% (10 Dec 2020 08:25) (94% - 96%) --- room air     MEDICATIONS  (STANDING):  atorvastatin 20 milliGRAM(s) Oral at bedtime  finasteride 5 milliGRAM(s) Oral daily  pantoprazole    Tablet 40 milliGRAM(s) Oral daily  sertraline 50 milliGRAM(s) Oral daily  tamsulosin 0.4 milliGRAM(s) Oral at bedtime    MEDICATIONS  (PRN):  acetaminophen   Tablet .. 650 milliGRAM(s) Oral every 4 hours PRN Temp greater or equal to 38C (100.4F), Mild Pain (1 - 3)  ondansetron Injectable 4 milliGRAM(s) IV Push every 6 hours PRN Nausea      labs reviewed     < from: MR Head No Cont Disc (12.04.20 @ 23:31) >  IMPRESSION:  Limited incomplete study.  Small acute infarct in the right frontal lobe measuring 2 mm.  Left quadrigeminal plate cistern subarachnoid hemorrhage. Possible thin bilateral frontal convexity subdural hemorrhages. Consider follow-up MRI with anesthesia sedation and/or follow-up noncontrast head CT.  No herniation pattern.  Preliminary report provided by [facility] Dr. Zac Krueger DO. The findings were verbally communicated via telephone conference with MARIBEL Garcia at 12:06 AM EST on 12/5/2020. The findings were acknowledged and understood.          DEMAR HAYS MD; Attending Radiologist  This document has been electronically signed. Dec  5 2020  9:46AM    < end of copied text >  
89 yo male brought to ER by EMS after fall at home. Pt states he was walking with walker and "slid" down to his buttock. Pt denies hitting his head. 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 Pt states he had been sliding down frequently. in ED ABC intact HD stabe. No headache no neck pain. no SOB, no fever. No chest or abdominal pain. Mild  left elbow pain abrasion no focal neurological complaint, Moves all extremities  He was noted to have old ecchymosis to left side of face and scalp when asked about it pt states he does not recall hitting his head a head Ct was ordering by the ER physician after his INR was found to be at 6.20. Poor historian, very [place]. (01 Dec 2020 10:00)    12/2: Pt seen and examined, awake, alert, fever better, no SOB, O2 sat WNL. Neurologically stable. Mild left elbow pain, Almost full range of motion.  12/3 no acutre events, CT elbow negative for fracture, still pending MRI of brain for neurosurgical evaluation.  12/4: pt evaled sitting up in bed. no overnight events. MRI scheduled for today   12/7: pt seen and examined sitting up in bed. no overnight events. denies pain or discomfort   12/9: pt seen and examined sitting up in bed while eating. denies pain or discomfort. no n/v. pending placement   12/10: pt seen and examined sitting up in bed. no pain or discomfort. covid tested positive today otherwise status quo   12/11: pt seen and examined sitting up in bed. no pain or discomfort noted by pt. pending retest on 12/13 12/12/20: Patient seen and examined. He denies any complaints. Waiting for rehab bed once COVID-19 negative.  12/13/20: COVID-19 positive today. No new issues  12/14: covid + yesterday. otherwise no complaints.   12/15: pt denies pain or discomfort today. no new complaints. covid retesting today   12/16: pt resting in bed comfortably. no new complaints. covid tested + yesterday   12/17: pt sitting up in bed after breakfast. no new complaints. awaiting covid retesting.   12/18: pt seen and examined sitting up in bed. no new complaints.   12/19: pt sitting up in bed. no pain or discomfort noted. pending negative covid testing.   12/21: pt seen and examined sitting up in bed, resting. no discomfort or pain. pending negative covid swab.     Vital Signs Last 24 Hrs  T(C): 36.3 (21 Dec 2020 08:41), Max: 36.5 (20 Dec 2020 21:08)  T(F): 97.3 (21 Dec 2020 08:41), Max: 97.7 (20 Dec 2020 21:08)  HR: 98 (21 Dec 2020 08:41) (72 - 98)  BP: 118/77 (21 Dec 2020 08:41) (118/77 - 142/70)  BP(mean): --  RR: 16 (21 Dec 2020 08:41) (16 - 19)  SpO2: 96% (21 Dec 2020 08:41) (94% - 98%) --- room air     PHYSICAL EXAM:    HEENT:  pupils equal and reactive, EOMI, no oropharyngeal lesions, erythema, exudates, oral thrush, left orbital hematoma improving  NECK:   supple, no carotid bruits, no palpable lymph nodes, no thyromegaly  CV:  +S1, +S2, regular, no murmurs or rubs  RESP:   lungs clear to auscultation bilaterally, no wheezing, rales, rhonchi, good air entry bilaterally  GI:  abdomen soft, non-tender, non-distended, normal BS, no bruits, no abdominal masses, no palpable masses  MSK:   normal muscle tone, no atrophy, no rigidity, no contractions  EXT:  no clubbing, no cyanosis, no edema, no calf pain, swelling or erythema. 5/5 strength   VASCULAR:  pulses equal and symmetric in the upper and lower extremities  NEURO:  AAOX2, no gross focal neurological deficits, follows commands, able to move extremities spontaneously  SKIN:  no ulcers, lesions or rashes        MEDICATIONS  (STANDING):  atorvastatin 20 milliGRAM(s) Oral at bedtime  finasteride 5 milliGRAM(s) Oral daily  pantoprazole    Tablet 40 milliGRAM(s) Oral daily  sertraline 50 milliGRAM(s) Oral daily  tamsulosin 0.4 milliGRAM(s) Oral at bedtime    MEDICATIONS  (PRN):  acetaminophen   Tablet .. 650 milliGRAM(s) Oral every 4 hours PRN Temp greater or equal to 38C (100.4F), Mild Pain (1 - 3)  ondansetron Injectable 4 milliGRAM(s) IV Push every 6 hours PRN Nausea    
89 yo male brought to ER by EMS after fall at home. Pt states he was walking with walker and "slid" down to his buttock. Pt denies hitting his head. 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 Pt states he had been sliding down frequently. in ED ABC intact HD stabe. No headache no neck pain. no SOB, no fever. No chest or abdominal pain. Mild  left elbow pain abrasion no focal neurological complaint, Moves all extremities  He was noted to have old ecchymosis to left side of face and scalp when asked about it pt states he does not recall hitting his head a head Ct was ordering by the ER physician after his INR was found to be at 6.20. Poor historian, very [place]. (01 Dec 2020 10:00)    12/2: Pt seen and examined, awake, alert, fever better, no SOB, O2 sat WNL. Neurologically stable. Mild left elbow pain, Almost full range of motion.  12/3 no acutre events, CT elbow negative for fracture, still pending MRI of brain for neurosurgical evaluation.  12/4: pt evaled sitting up in bed. no overnight events. MRI scheduled for today   12/7: pt seen and examined sitting up in bed. no overnight events. denies pain or discomfort   12/9: pt seen and examined sitting up in bed while eating. denies pain or discomfort. no n/v. pending placement   12/10: pt seen and examined sitting up in bed. no pain or discomfort. covid tested positive today otherwise status quo   12/11: pt seen and examined sitting up in bed. no pain or discomfort noted by pt. pending retest on 12/13 12/12/20: Patient seen and examined. He denies any complaints. Waiting for rehab bed once COVID-19 negative.  12/13/20: COVID-19 positive today. No new issues  12/14: covid + yesterday. otherwise no complaints.     VITALS:  T(F): 96.8 (12-14-20 @ 07:48), Max: 97.4 (12-13-20 @ 21:40)  HR: 64 (12-14-20 @ 07:48) (61 - 81)  BP: 138/69 (12-14-20 @ 07:48) (119/79 - 144/71)  RR: 17 (12-14-20 @ 07:48) (16 - 17)  SpO2: 96% (12-14-20 @ 07:48) (95% - 100%)  Wt(kg): --      PHYSICAL EXAM:    HEENT:  pupils equal and reactive, EOMI, no oropharyngeal lesions, erythema, exudates, oral thrush, left occipital hematoma   NECK:   supple, no carotid bruits, no palpable lymph nodes, no thyromegaly  CV:  +S1, +S2, regular, no murmurs or rubs  RESP:   lungs clear to auscultation bilaterally, no wheezing, rales, rhonchi, good air entry bilaterally  GI:  abdomen soft, non-tender, non-distended, normal BS, no bruits, no abdominal masses, no palpable masses  MSK:   normal muscle tone, no atrophy, no rigidity, no contractions  EXT:  no clubbing, no cyanosis, no edema, no calf pain, swelling or erythema. 5/5 strength   VASCULAR:  pulses equal and symmetric in the upper and lower extremities  NEURO:  AAOX2, no gross focal neurological deficits, follows commands, able to move extremities spontaneously  SKIN:  no ulcers, lesions or rashes        MEDICATIONS  (STANDING):  atorvastatin 20 milliGRAM(s) Oral at bedtime  finasteride 5 milliGRAM(s) Oral daily  pantoprazole    Tablet 40 milliGRAM(s) Oral daily  sertraline 50 milliGRAM(s) Oral daily  tamsulosin 0.4 milliGRAM(s) Oral at bedtime    MEDICATIONS  (PRN):  acetaminophen   Tablet .. 650 milliGRAM(s) Oral every 4 hours PRN Temp greater or equal to 38C (100.4F), Mild Pain (1 - 3)  ondansetron Injectable 4 milliGRAM(s) IV Push every 6 hours PRN Nausea    
89 yo male brought to ER by EMS after fall at home. Pt states he was walking with walker and "slid" down to his buttock. Pt denies hitting his head. 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 Pt states he had been sliding down frequently. in ED ABC intact HD stabe. No headache no neck pain. no SOB, no fever. No chest or abdominal pain. Mild  left elbow pain abrasion no focal neurological complaint, Moves all extremities  He was noted to have old ecchymosis to left side of face and scalp when asked about it pt states he does not recall hitting his head a head Ct was ordering by the ER physician after his INR was found to be at 6.20. Poor historian, very [place]. (01 Dec 2020 10:00)  12/2: Pt seen and examined, awake, alert, fever better, no SOB, O2 sat WNL. Neurologically stable. Mild left elbow pain, Almost full range of motion.  12/3 no acutre events, CT elbow negative for fracture, still pending MRI of brain for neurosurgical evaluation.    Vital Signs Last 24 Hrs  T(C): 36.7 (03 Dec 2020 20:36), Max: 37.2 (03 Dec 2020 01:38)  T(F): 98 (03 Dec 2020 20:36), Max: 98.9 (03 Dec 2020 01:38)  HR: 60 (03 Dec 2020 20:36) (56 - 84)  BP: 140/69 (03 Dec 2020 20:36) (112/63 - 155/76)  BP(mean): 75 (03 Dec 2020 14:00) (75 - 110)  RR: 21 (03 Dec 2020 20:36) (20 - 28)  SpO2: 96% (03 Dec 2020 20:36) (95% - 97%)    < from: CT Elbow No Cont, Left (12.03.20 @ 15:33) >  IMPRESSION:    No acute displaced fracture or dislocation is seen.    Mild subcutaneous stranding/swelling at the posterior aspect of the elbow.    Small osseous proliferative changes at the lateral aspect of the lateral humeral epicondyle, likely related to enthesopathy/degenerative change.    < end of copied text >  
89 yo male brought to ER by EMS after fall at home. Pt states he was walking with walker and "slid" down to his buttock. Pt denies hitting his head. 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 Pt states he had been sliding down frequently. in ED ABC intact HD stabe. No headache no neck pain. no SOB, no fever. No chest or abdominal pain. Mild  left elbow pain abrasion no focal neurological complaint, Moves all extremities  He was noted to have old ecchymosis to left side of face and scalp when asked about it pt states he does not recall hitting his head a head Ct was ordering by the ER physician after his INR was found to be at 6.20. Poor historian, very [place]. (01 Dec 2020 10:00)    12/2: Pt seen and examined, awake, alert, fever better, no SOB, O2 sat WNL. Neurologically stable. Mild left elbow pain, Almost full range of motion.  12/3 no acutre events, CT elbow negative for fracture, still pending MRI of brain for neurosurgical evaluation.  12/4: pt evaled sitting up in bed. no overnight events. MRI scheduled for today   12/7: pt seen and examined sitting up in bed. no overnight events. denies pain or discomfort   12/9: pt seen and examined sitting up in bed while eating. denies pain or discomfort. no n/v. pending placement   12/10: pt seen and examined sitting up in bed. no pain or discomfort. covid tested positive today otherwise status quo   12/11: pt seen and examined sitting up in bed. no pain or discomfort noted by pt. pending retest on 12/13 12/12/20: Patient seen and examined. He denies any complaints. Waiting for rehab bed once COVID-19 negative.    Vital Signs Last 24 Hrs  T(C): 35.3 (12 Dec 2020 08:10), Max: 36.7 (11 Dec 2020 15:23)  T(F): 95.6 (12 Dec 2020 08:10), Max: 98 (11 Dec 2020 15:23)  HR: 69 (12 Dec 2020 08:10) (67 - 70)  BP: 153/82 (12 Dec 2020 08:10) (136/77 - 153/82)  BP(mean): --  RR: 15 (12 Dec 2020 08:10) (15 - 18)  SpO2: 94% (12 Dec 2020 08:10) (94% - 95%)    PHYSICAL EXAM:  Constitutional: NAD, GCS: 15/15  AOX2  Eyes:  WNL  ENMT:  WNL  Neck:  WNL, non tender  Back: Non tender  Respiratory: CTABL  Cardiovascular:  S1+S2+0  Gastrointestinal: Soft, ND , NT  Genitourinary:  WNL  Extremities: NV intact, left arm contusion stable.  Vascular:  Intact  Neurological: No focal neurological deficit,  CN, motor and sensory system grossly intact.  Skin: WNL        MEDICATIONS  (STANDING):  atorvastatin 20 milliGRAM(s) Oral at bedtime  finasteride 5 milliGRAM(s) Oral daily  pantoprazole    Tablet 40 milliGRAM(s) Oral daily  sertraline 50 milliGRAM(s) Oral daily  tamsulosin 0.4 milliGRAM(s) Oral at bedtime    MEDICATIONS  (PRN):  acetaminophen   Tablet .. 650 milliGRAM(s) Oral every 4 hours PRN Temp greater or equal to 38C (100.4F), Mild Pain (1 - 3)  ondansetron Injectable 4 milliGRAM(s) IV Push every 6 hours PRN Nausea    
CC:Patient is a 88y old  Male who presents with a chief complaint of Fall (04 Dec 2020 15:36)      Subjective:  Pt seen and examined at bedside with chaperone. Pt is awake, alert, comfortable, cooperative, pt in no acute distress. Pt denied c/o fever, chills, chest pain, SOB, abd pain, N/V/D, extremity pain or dysfunction, hemoptysis, hematemesis, hematuria, hematochexia, headache, diplopia, vertigo, dizzyness. Pt tolerating diet, (+) void, (+) oob, (+) bowel function    ROS:  otherwise as abovementioned ROS    Vital Signs Last 24 Hrs  T(C): 36.4 (05 Dec 2020 09:01), Max: 37.1 (04 Dec 2020 17:39)  T(F): 97.6 (05 Dec 2020 09:01), Max: 98.8 (04 Dec 2020 17:39)  HR: 71 (05 Dec 2020 09:01) (60 - 71)  BP: 140/70 (05 Dec 2020 09:01) (130/84 - 163/72)  BP(mean): --  RR: 20 (05 Dec 2020 09:01) (20 - 20)  SpO2: 94% (05 Dec 2020 09:01) (94% - 95%)    Labs:              Meds:  acetaminophen   Tablet .. 650 milliGRAM(s) Oral every 4 hours PRN  atorvastatin 20 milliGRAM(s) Oral at bedtime  finasteride 5 milliGRAM(s) Oral daily  ondansetron Injectable 4 milliGRAM(s) IV Push every 6 hours PRN  pantoprazole    Tablet 40 milliGRAM(s) Oral daily  sertraline 50 milliGRAM(s) Oral daily  tamsulosin 0.4 milliGRAM(s) Oral at bedtime      Radiology:  < from: MR Head No Cont Disc (12.04.20 @ 23:31) >  EXAM:  MR BRAIN DC                            PROCEDURE DATE:  12/04/2020          INTERPRETATION:  Exam: MR Head Without Contrast  Exam date and time: 12/4/2020 11:10 PM  Age: 88 years old Clinical indication: Screening exam; Patient HX: Cerebral hemorrhage suspected. Note PT became agitated and uncooperative very limited exam    TECHNIQUE: Imaging protocol: MR of the head without contrast.    COMPARISON: CT HEAD 12/2/2020 10:08 AM    FINDINGS:  Limitations: Motion artifact degrades the images. Only DWI and ADC images were obtained.  Brain: 2 mm acute infarct in the right frontal lobe.  Cerebral ventricles: Normal. No ventriculomegaly.  Possible tiny bilateral frontal lobe subdural hemorrhages on image 46, series 5. 5 mm left quadrigeminalplate cistern subarachnoid hemorrhage image 41, series 5  The patient's known intraventricular hemorrhage is not well demonstrated on the limited sequences obtained    IMPRESSION:  Limited incomplete study.  Small acute infarct in the right frontal lobe measuring 2 mm.  Left quadrigeminal plate cistern subarachnoid hemorrhage. Possible thin bilateral frontal convexity subdural hemorrhages. Consider follow-up MRI with anesthesia sedation and/or follow-up noncontrast head CT.  No herniation pattern.  Preliminary report provided by [email or web address] Dr. Zac Krueger DO. The findings were verbally communicated via telephone conference with MARIBEL Garcia at 12:06 AM EST on 12/5/2020. The findings were acknowledged and understood.            DEMAR HAYS MD; Attending Radiologist  This document has been electronically signed. Dec  5 2020  9:46AM    < end of copied text >      Physical exam:  GCS of 15  Pt in no acute distress  Airway is patent  Breathing is symmetric and unlabored  Neuro: CN II-XII grossly intact  Psych: normal affect  HEENT: normocephalic, ALPHONSO, EOM wnl, + left periorbital ecchymosis, no gross craniofacial bony pathology to exam otherwise  Neck: No tracheal deviation, no JVD, no crepitus, no ecchymosis, no hematoma  Chest: No gross rib or sternal pathology or tenderness to exam, no crepitus, no ecchymosis, no hematoma  Resp: CTAB  CVS: S1S2(+)  ABD: bowel sounds (+), soft, nontender, non distended, no rebound, no guarding, no rigidity, no pelvic instability to exam  EXT: no calf tenderness or edema to exam b/l, pt has good capillary refill in all digits. Sensoromotor function grossly intact, on VTE prophylaxis  Skin: no adverse skin changes to exam    
CC:Patient is a 88y old  Male who presents with a chief complaint of Fall (07 Dec 2020 13:30)      Subjective:  Pt seen and examined at bedside with chaperone. Pt is awake, alert, comfortable, cooperative, pt in no acute distress. Pt denied c/o fever, chills, chest pain, SOB, abd pain, N/V/D, extremity pain or dysfunction, hemoptysis, hematemesis, hematuria, hematochexia, headache, diplopia, vertigo, dizzyness. Pt tolerating diet, (+) void, (+) oob, (+) bowel function    ROS:  otherwise as abovementioned ROS    Vital Signs Last 24 Hrs  T(C): 36.9 (08 Dec 2020 08:20), Max: 36.9 (08 Dec 2020 08:20)  T(F): 98.4 (08 Dec 2020 08:20), Max: 98.4 (08 Dec 2020 08:20)  HR: 72 (08 Dec 2020 08:20) (72 - 81)  BP: 136/76 (08 Dec 2020 08:20) (132/70 - 136/76)  BP(mean): --  RR: 18 (08 Dec 2020 08:20) (18 - 18)  SpO2: 93% (08 Dec 2020 08:20) (93% - 96%)    Labs:                            Meds:  acetaminophen   Tablet .. 650 milliGRAM(s) Oral every 4 hours PRN  atorvastatin 20 milliGRAM(s) Oral at bedtime  finasteride 5 milliGRAM(s) Oral daily  ondansetron Injectable 4 milliGRAM(s) IV Push every 6 hours PRN  pantoprazole    Tablet 40 milliGRAM(s) Oral daily  sertraline 50 milliGRAM(s) Oral daily  tamsulosin 0.4 milliGRAM(s) Oral at bedtime      Radiology:      Physical exam:  GCS of 15  Pt in no acute distress  Airway is patent  Breathing is symmetric and unlabored  Neuro: CN II-XII grossly intact  Psych: normal affect  HEENT: normocephalic, ALPHONSO, EOM wnl, + left periorbital ecchymosis, no gross craniofacial bony pathology to exam otherwise  Neck: No tracheal deviation, no JVD, no crepitus, no ecchymosis, no hematoma  Chest: No gross rib or sternal pathology or tenderness to exam, no crepitus, no ecchymosis, no hematoma  Resp: CTAB  CVS: S1S2(+)  ABD: bowel sounds (+), soft, nontender, non distended, no rebound, no guarding, no rigidity, no pelvic instability to exam  EXT: no calf tenderness or edema to exam b/l, pt has good capillary refill in all digits. Sensoromotor function grossly intact, on VTE prophylaxis  Skin: no adverse skin changes to exam      
CC:Patient is a 88y old  Male who presents with a chief complaint of closed head injury (03 Dec 2020 21:34)      Subjective:  Pt seen and examined at bedside with chaperone. Pt is awake, alert, comfortable, cooperative, pt in no acute distress. Pt denied c/o fever, chills, chest pain, SOB, abd pain, N/V/D, extremity pain or dysfunction, hemoptysis, hematemesis, hematuria, hematochexia, headache, diplopia, vertigo, dizzyness. Pt tolerating diet, (+) void, (+) oob, (+) bowel function    ROS:  otherwise as abovementioned ROS    Vital Signs Last 24 Hrs  T(C): 36.4 (04 Dec 2020 09:24), Max: 36.7 (03 Dec 2020 20:36)  T(F): 97.5 (04 Dec 2020 09:24), Max: 98 (03 Dec 2020 20:36)  HR: 90 (04 Dec 2020 09:24) (56 - 90)  BP: 166/83 (04 Dec 2020 09:24) (112/63 - 166/83)  BP(mean): 75 (03 Dec 2020 14:00) (75 - 103)  RR: 21 (04 Dec 2020 09:24) (21 - 27)  SpO2: 95% (04 Dec 2020 09:24) (95% - 97%)    Labs:                            Meds:  acetaminophen   Tablet .. 650 milliGRAM(s) Oral every 4 hours PRN  atorvastatin 20 milliGRAM(s) Oral at bedtime  cefTRIAXone Injectable.      cefTRIAXone Injectable. 1000 milliGRAM(s) IV Push every 24 hours  finasteride 5 milliGRAM(s) Oral daily  ondansetron Injectable 4 milliGRAM(s) IV Push every 6 hours PRN  pantoprazole    Tablet 40 milliGRAM(s) Oral daily  sertraline 50 milliGRAM(s) Oral daily  tamsulosin 0.4 milliGRAM(s) Oral at bedtime      Radiology:  Pending MRI brain 12/4/20    < from: CT Head No Cont (12.02.20 @ 10:10) >  EXAM:  CT BRAIN                            PROCEDURE DATE:  12/02/2020          INTERPRETATION:  Exam Date: 12/2/2020 10:10 AM    CT head without IV contrast    CLINICAL INFORMATION: follwo up    SAH, IVH    TECHNIQUE: Contiguous axial 5 mm sections were obtained through the head.    COMPARISON: CT head 12/1/2020    FINDINGS:  Reidentified is a 6 mm focus of hyperattenuation in the left ambient cistern versus the posterior lateral aspect of the left cerebral peduncle, may reflect a stable smallfocus of subarachnoid hemorrhage versus a cavernoma, unchanged. Very minimal layering hemorrhage in the lateral ventricles has decreased since prior exam. There are multiple chronic lacunar infarcts in the right basal ganglia. The ventricles and sulci are prominent, compatible with age-related generalized cerebral volume loss.   There is no CT evidence for acute cerebral cortical infarct.  There is periventricular and subcortical white matter hypoattenuation,  most compatible with chronic microvascular ischemic changes.   No mass effect is found in the brain.  There is no midline shift or herniation pattern.      Small amount of mucosal inflammation is present in the visualized portion of the right maxillary sinus.    IMPRESSION:    Reidentified is a 6 mm focus of hyperattenuation in the left ambient cistern versus the posterior lateral aspect of the left cerebral peduncle, may reflect a stable small focus of subarachnoid hemorrhage versus a cavernoma, unchanged. Very minimal layering hemorrhage in the lateral ventricles has decreased since prior exam. There are multiple chronic lacunar infarcts in the right basal ganglia.    Rest of the chronic changes as above.              EVON ARCHER MD; Attending Radiologist  This document has been electronically signed. Dec  2 2020 10:25AM    < end of copied text >      Physical exam:  GCS of 15  Pt in no acute distress  Airway is patent  Breathing is symmetric and unlabored  Neuro: CN II-XII grossly intact  Psych: normal affect  HEENT: normocephalic, ALPHONSO, EOM wnl, + left periorbital ecchymosis, no gross craniofacial bony pathology to exam otherwise  Neck: No tracheal deviation, no JVD, no crepitus, no ecchymosis, no hematoma  Chest: No gross rib or sternal pathology or tenderness to exam, no crepitus, no ecchymosis, no hematoma  Resp: CTAB  CVS: S1S2(+)  ABD: bowel sounds (+), soft, nontender, non distended, no rebound, no guarding, no rigidity, no pelvic instability to exam  EXT: no calf tenderness or edema to exam b/l, pt has good capillary refill in all digits. Sensoromotor function grossly intact, on VTE prophylaxis  Skin: no adverse skin changes to exam      
CC:Patient is a 88y old  Male who presents with a chief complaint of fall head injury (21 Dec 2020 11:54)      Subjective:  Pt seen and examined at bedside with chaperone. Pt is awake, alert, comfortable, cooperative, pt in no acute distress. Pt denied c/o fever, chills, chest pain, SOB, abd pain, N/V/D, extremity pain or dysfunction, hemoptysis, hematemesis, hematuria, hematochexia, headache, diplopia, vertigo, dizzyness. Pt tolerating diet, (+) void, (+) oob, (+) bowel function    ROS:  otherwise as abovementioned ROS    Vital Signs Last 24 Hrs  T(C): 36.7 (22 Dec 2020 08:40), Max: 36.7 (21 Dec 2020 21:39)  T(F): 98 (22 Dec 2020 08:40), Max: 98 (21 Dec 2020 21:39)  HR: 79 (22 Dec 2020 08:40) (68 - 79)  BP: 130/70 (22 Dec 2020 08:40) (119/63 - 152/76)  BP(mean): --  RR: 17 (22 Dec 2020 08:40) (17 - 19)  SpO2: 98% (22 Dec 2020 08:40) (94% - 99%)    Labs:                            Meds:  acetaminophen   Tablet .. 650 milliGRAM(s) Oral every 4 hours PRN  atorvastatin 20 milliGRAM(s) Oral at bedtime  finasteride 5 milliGRAM(s) Oral daily  ondansetron Injectable 4 milliGRAM(s) IV Push every 6 hours PRN  pantoprazole    Tablet 40 milliGRAM(s) Oral daily  sertraline 50 milliGRAM(s) Oral daily  tamsulosin 0.4 milliGRAM(s) Oral at bedtime      Radiology:      Physical exam:  GCS of 15  Pt in no acute distress  Airway is patent  Breathing is symmetric and unlabored  Neuro: CN II-XII grossly intact  Psych: normal affect  HEENT: normocephalic, ALPHONSO, EOM wnl, + left periorbital ecchymosis, no gross craniofacial bony pathology to exam otherwise  Neck: No tracheal deviation, no JVD, no crepitus, no ecchymosis, no hematoma  Chest: No gross rib or sternal pathology or tenderness to exam, no crepitus, no ecchymosis, no hematoma  Resp: CTAB  CVS: S1S2(+)  ABD: bowel sounds (+), soft, nontender, non distended, no rebound, no guarding, no rigidity, no pelvic instability to exam  EXT: no calf tenderness or edema to exam b/l, pt has good capillary refill in all digits. Sensoromotor function grossly intact, on VTE prophylaxis  Skin: no adverse skin changes to exam    
CC:Patient is a 88y old  Male who presents with a chief complaint of fall, head injury (14 Dec 2020 13:07)      Subjective:  Pt seen and examined at bedside with chaperone. Pt is awake, alert, comfortable, cooperative, pt in no acute distress. Pt denied c/o fever, chills, chest pain, SOB, abd pain, N/V/D, extremity pain or dysfunction, hemoptysis, hematemesis, hematuria, hematochexia, headache, diplopia, vertigo, dizzyness. Pt tolerating diet, (+) void, (+) oob, (+) bowel function    ROS:  otherwise as abovementioned ROS    Vital Signs Last 24 Hrs  T(C): 36 (14 Dec 2020 07:48), Max: 36.3 (13 Dec 2020 21:40)  T(F): 96.8 (14 Dec 2020 07:48), Max: 97.4 (13 Dec 2020 21:40)  HR: 70 (14 Dec 2020 13:52) (61 - 81)  BP: 107/64 (14 Dec 2020 13:52) (107/64 - 144/71)  BP(mean): --  RR: 18 (14 Dec 2020 13:52) (16 - 18)  SpO2: 96% (14 Dec 2020 13:52) (95% - 100%)    Labs:                            Meds:  acetaminophen   Tablet .. 650 milliGRAM(s) Oral every 4 hours PRN  atorvastatin 20 milliGRAM(s) Oral at bedtime  finasteride 5 milliGRAM(s) Oral daily  ondansetron Injectable 4 milliGRAM(s) IV Push every 6 hours PRN  pantoprazole    Tablet 40 milliGRAM(s) Oral daily  sertraline 50 milliGRAM(s) Oral daily  tamsulosin 0.4 milliGRAM(s) Oral at bedtime      Radiology:      Physical exam:  GCS of 15  Pt in no acute distress  Airway is patent  Breathing is symmetric and unlabored  Neuro: CN II-XII grossly intact  Psych: normal affect  HEENT: normocephalic, ALPHONSO, EOM wnl, + left periorbital ecchymosis, no gross craniofacial bony pathology to exam otherwise  Neck: No tracheal deviation, no JVD, no crepitus, no ecchymosis, no hematoma  Chest: No gross rib or sternal pathology or tenderness to exam, no crepitus, no ecchymosis, no hematoma  Resp: CTAB  CVS: S1S2(+)  ABD: bowel sounds (+), soft, nontender, non distended, no rebound, no guarding, no rigidity, no pelvic instability to exam  EXT: no calf tenderness or edema to exam b/l, pt has good capillary refill in all digits. Sensoromotor function grossly intact, on VTE prophylaxis  Skin: no adverse skin changes to exam  
CC:Patient is a 88y old  Male who presents with a chief complaint of fall, head injury (14 Dec 2020 13:07)      Subjective:  Pt seen and examined at bedside with chaperone. Pt is awake, alert, comfortable, cooperative, pt in no acute distress. Pt denied c/o fever, chills, chest pain, SOB, abd pain, N/V/D, extremity pain or dysfunction, hemoptysis, hematemesis, hematuria, hematochexia, headache, diplopia, vertigo, dizzyness. Pt tolerating diet, (+) void, (+) oob, (+) bowel function    ROS:  otherwise as abovementioned ROS    Vital Signs Last 24 Hrs  T(C): 36.3 (15 Dec 2020 08:51), Max: 36.4 (14 Dec 2020 21:00)  T(F): 97.3 (15 Dec 2020 08:51), Max: 97.6 (14 Dec 2020 21:00)  HR: 64 (15 Dec 2020 08:51) (64 - 74)  BP: 128/77 (15 Dec 2020 08:51) (107/64 - 128/77)  BP(mean): --  RR: 20 (15 Dec 2020 08:51) (18 - 20)  SpO2: 97% (15 Dec 2020 08:51) (96% - 97%)    Labs:                            Meds:  acetaminophen   Tablet .. 650 milliGRAM(s) Oral every 4 hours PRN  atorvastatin 20 milliGRAM(s) Oral at bedtime  finasteride 5 milliGRAM(s) Oral daily  ondansetron Injectable 4 milliGRAM(s) IV Push every 6 hours PRN  pantoprazole    Tablet 40 milliGRAM(s) Oral daily  sertraline 50 milliGRAM(s) Oral daily  tamsulosin 0.4 milliGRAM(s) Oral at bedtime      Radiology:      Physical exam:  GCS of 15  Pt in no acute distress  Airway is patent  Breathing is symmetric and unlabored  Neuro: CN II-XII grossly intact  Psych: normal affect  HEENT: normocephalic, ALPHONSO, EOM wnl, + left periorbital ecchymosis, no gross craniofacial bony pathology to exam otherwise  Neck: No tracheal deviation, no JVD, no crepitus, no ecchymosis, no hematoma  Chest: No gross rib or sternal pathology or tenderness to exam, no crepitus, no ecchymosis, no hematoma  Resp: CTAB  CVS: S1S2(+)  ABD: bowel sounds (+), soft, nontender, non distended, no rebound, no guarding, no rigidity, no pelvic instability to exam  EXT: no calf tenderness or edema to exam b/l, pt has good capillary refill in all digits. Sensoromotor function grossly intact, on VTE prophylaxis  Skin: no adverse skin changes to exam      
CC:Patient is a 88y old  Male who presents with a chief complaint of fall, head injury (16 Dec 2020 12:26)      Subjective:  Pt seen and examined at bedside with chaperone. Pt is awake, alert, comfortable, cooperative, pt in no acute distress. Pt denied c/o fever, chills, chest pain, SOB, abd pain, N/V/D, extremity pain or dysfunction, hemoptysis, hematemesis, hematuria, hematochexia, headache, diplopia, vertigo, dizzyness. Pt tolerating diet, (+) void, (+) oob, (+) bowel function    ROS:  otherwise as abovementioned ROS    Vital Signs Last 24 Hrs  T(C): 36.1 (17 Dec 2020 07:35), Max: 36.5 (16 Dec 2020 21:34)  T(F): 97 (17 Dec 2020 07:35), Max: 97.7 (16 Dec 2020 21:34)  HR: 70 (17 Dec 2020 07:35) (67 - 70)  BP: 139/76 (17 Dec 2020 07:35) (118/77 - 139/76)  BP(mean): --  RR: 18 (17 Dec 2020 07:35) (18 - 18)  SpO2: 96% (17 Dec 2020 07:35) (96% - 97%)    Labs:                            Meds:  acetaminophen   Tablet .. 650 milliGRAM(s) Oral every 4 hours PRN  atorvastatin 20 milliGRAM(s) Oral at bedtime  finasteride 5 milliGRAM(s) Oral daily  ondansetron Injectable 4 milliGRAM(s) IV Push every 6 hours PRN  pantoprazole    Tablet 40 milliGRAM(s) Oral daily  sertraline 50 milliGRAM(s) Oral daily  tamsulosin 0.4 milliGRAM(s) Oral at bedtime      Radiology:      Physical exam:  GCS of 15  Pt in no acute distress  Airway is patent  Breathing is symmetric and unlabored  Neuro: CN II-XII grossly intact  Psych: normal affect  HEENT: normocephalic, ALPHONSO, EOM wnl, + left periorbital ecchymosis, no gross craniofacial bony pathology to exam otherwise  Neck: No tracheal deviation, no JVD, no crepitus, no ecchymosis, no hematoma  Chest: No gross rib or sternal pathology or tenderness to exam, no crepitus, no ecchymosis, no hematoma  Resp: CTAB  CVS: S1S2(+)  ABD: bowel sounds (+), soft, nontender, non distended, no rebound, no guarding, no rigidity, no pelvic instability to exam  EXT: no calf tenderness or edema to exam b/l, pt has good capillary refill in all digits. Sensoromotor function grossly intact, on VTE prophylaxis  Skin: no adverse skin changes to exam      
CC:Patient is a 88y old  Male who presents with a chief complaint of s/p fall (05 Dec 2020 11:56)      Subjective:  Pt seen and examined at bedside with chaperone. Pt is awake, alert, comfortable, cooperative, pt in no acute distress. Pt denied c/o fever, chills, chest pain, SOB, abd pain, N/V/D, extremity pain or dysfunction, hemoptysis, hematemesis, hematuria, hematochexia, headache, diplopia, vertigo, dizzyness. Pt tolerating diet, (+) void, (+) oob, (+) bowel function    ROS:  otherwise as abovementioned ROS    Vital Signs Last 24 Hrs  T(C): 36.4 (06 Dec 2020 09:08), Max: 37 (05 Dec 2020 16:26)  T(F): 97.6 (06 Dec 2020 09:08), Max: 98.6 (05 Dec 2020 16:26)  HR: 56 (06 Dec 2020 09:08) (56 - 72)  BP: 151/91 (06 Dec 2020 09:08) (134/78 - 162/76)  BP(mean): --  RR: 18 (06 Dec 2020 09:08) (18 - 18)  SpO2: 96% (06 Dec 2020 09:08) (96% - 96%)    Labs:                            Meds:  acetaminophen   Tablet .. 650 milliGRAM(s) Oral every 4 hours PRN  atorvastatin 20 milliGRAM(s) Oral at bedtime  finasteride 5 milliGRAM(s) Oral daily  ondansetron Injectable 4 milliGRAM(s) IV Push every 6 hours PRN  pantoprazole    Tablet 40 milliGRAM(s) Oral daily  sertraline 50 milliGRAM(s) Oral daily  tamsulosin 0.4 milliGRAM(s) Oral at bedtime      Radiology:      Physical exam:  GCS of 15  Pt in no acute distress  Airway is patent  Breathing is symmetric and unlabored  Neuro: CN II-XII grossly intact  Psych: normal affect  HEENT: normocephalic, ALPHONSO, EOM wnl, + left periorbital ecchymosis, no gross craniofacial bony pathology to exam otherwise  Neck: No tracheal deviation, no JVD, no crepitus, no ecchymosis, no hematoma  Chest: No gross rib or sternal pathology or tenderness to exam, no crepitus, no ecchymosis, no hematoma  Resp: CTAB  CVS: S1S2(+)  ABD: bowel sounds (+), soft, nontender, non distended, no rebound, no guarding, no rigidity, no pelvic instability to exam  EXT: no calf tenderness or edema to exam b/l, pt has good capillary refill in all digits. Sensoromotor function grossly intact, on VTE prophylaxis  Skin: no adverse skin changes to exam    
Patient is a 88y old  Male who presents with a chief complaint of     HPI: 89 yo male brought to ER by EMS after fall at home. Pt states he was walking with walker and "slid" down to his buttock. Pt denies hitting his head. 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 Pt states he had been sliding down" frequently. He was noted to have old ecchymosis to left side of face and scalp when asked about it pt states he does not recall hitting his head a head Ct was ordering by the ER physician after his INR was foudn to be at 6.20. head Ct shows a small amount of layering IVH and a small area of hyperintensity in the left midbrain. Pt is a poor historian and is unclear why he is on full dose AC.       12/3- rpt head CT stable, awaiting MRI +/- to r/o cavernoma       PAST MEDICAL & SURGICAL HISTORY:  Cerebrovascular accident (CVA), unspecified mechanism        FAMILY HISTORY:      Social Hx:  Nonsmoker, no drug or alcohol use    MEDICATIONS  (STANDING):       Allergies    No Known Allergies    Intolerances        ROS: Pertinent positives in HPI, all other ROS were reviewed and are negative.      Vital Signs Last 24 Hrs  T(C): 36.6 (03 Dec 2020 09:43), Max: 37.2 (03 Dec 2020 01:38)  T(F): 97.9 (03 Dec 2020 09:43), Max: 98.9 (03 Dec 2020 01:38)  HR: 56 (03 Dec 2020 14:00) (56 - 93)  BP: 112/63 (03 Dec 2020 14:00) (112/63 - 155/76)  BP(mean): 75 (03 Dec 2020 14:00) (75 - 110)  RR: 27 (03 Dec 2020 14:00) (18 - 28)  SpO2: 97% (03 Dec 2020 12:00) (83% - 100%)    MEDICATIONS  (STANDING):  atorvastatin 20 milliGRAM(s) Oral at bedtime  cefTRIAXone Injectable.      cefTRIAXone Injectable. 1000 milliGRAM(s) IV Push every 24 hours  finasteride 5 milliGRAM(s) Oral daily  pantoprazole    Tablet 40 milliGRAM(s) Oral daily  sertraline 50 milliGRAM(s) Oral daily  tamsulosin 0.4 milliGRAM(s) Oral at bedtime    MEDICATIONS  (PRN):  acetaminophen   Tablet .. 650 milliGRAM(s) Oral every 4 hours PRN Temp greater or equal to 38C (100.4F), Mild Pain (1 - 3)  ondansetron Injectable 4 milliGRAM(s) IV Push every 6 hours PRN Nausea        Constitutional: awake and alert. [place]  HEENT: + large area of old ecchymosis noted to left temporal and parietal scalp, PERRLA, EOMI,   Neck: Supple. no pain to palpation or ROM   Respiratory: Breath sounds are clear bilaterally  Cardiovascular: S1 and S2, regular  rhythm  Gastrointestinal: soft, nontender  Extremities:  ana LE pitting edema and skin discoloration   Skin: multiple areas of ecchymosis ana UE l>R, head    Neurological exam:  HF: A x O x 3 [place] confused to timeline of events,. Appropriately interactive, normal affect. Speech fluent, No Aphasia or paraphasic errors.   CN: [place], EOMI, VFF, facial sensation normal, no NLFD, tongue midline  Motor: No pronator drift, MILLER x4 antigravity strong, LUE distal contracted, + tremor  Sens: Intact to light touch  Gait/Balance: /Cannot test          Labs:   12-01    138  |  109<H>  |  33<H>  ----------------------------<  100<H>  4.4   |  25  |  1.33<H>    Ca    8.7      01 Dec 2020 04:33    TPro  7.5  /  Alb  3.3  /  TBili  0.7  /  DBili  x   /  AST  34  /  ALT  18  /  AlkPhos  61  12-01                              12.7   5.65  )-----------( 127      ( 01 Dec 2020 04:33 )             39.3       Radiology:  - CT Head:    EXAM:  CT CERVICAL SPINE                          EXAM:  CT BRAIN                            PROCEDURE DATE:  12/01/2020          INTERPRETATION:  CLINICAL INFORMATION: Status post fall on Coumadin.    TECHNIQUE: CT of the head was performed in multiple axial sections from the base of the skull to the vertex with coronal and sagittal reformats.  CT of the cervical spine was performed in bone and soft tissue windows with coronal and sagittal reformats. No intravenous contrast was administered. Beam hardening artifact slightly obscures evaluation of the posterior fossa and brainstem.    COMPARISON: No similar prior studies are available for comparison.    FINDINGS:    Head:  Parenchymal volume loss is noted with prominent ventricles and sulci. Chronic microvascular ischemic changes are identified. Gliosis and encephalomalacia is seen in the right corona radiata extending into the external capsule likely sequela of prior infarction. No acute territorial infarct is demonstrated.    A small amount of hemorrhage is layering in the lateral ventricles, right greater than left. A focus of high attenuation measuring 6 mm is seen adjacent to or within the left posterolateral aspect of the midbrain likely representing subarachnoid hemorrhage; however, a cavernoma could also be considered. Mild asymmetric thickening of the left tentorium is seen suspicious for a layering subdural hematoma.    Evaluation of the osseous structures with the appropriate window appears unremarkable. Vascular calcifications are noted. Sequela of left lens surgery is seen. Moderate mucosal thickening is seen in the right maxillary sinus. Mild mucosal thickening is seen in the left ethmoid sinus. The remaining visualized paranasal sinuses and mastoid air cells are clear. Mild left frontal scalp soft tissue swelling is noted.    Cervical spine:  Bones: Straightening of the normal cervical lordosis is seen which is likely due to muscle spasm versus patient positioning. The cervical vertebral body heights and alignment are maintained. No fracture or spondylolisthesis is demonstrated. Moderate disc space narrowing and marginal osteophyte formation is seen at C4-C5, C5-C6 and C6-C7. Multilevel posterior disc osteophyte complexes are seen without evidence of severe spinal canal stenosis. Multilevel neural foraminal stenosis is noted.    Soft tissues: The prevertebral soft tissues are unremarkable. A 7 mm right thyroid lobe nodule is noted.    Lung apices: Clear.    IMPRESSION:  1. Small amount of layering hemorrhage in the lateral ventricles, left greater than right.  2. Focus of high attenuation adjacent to or within the left posterolateral aspect of the midbrain likely representing subarachnoid hemorrhage; however, a cavernoma could also be considered.  3. Mild asymmetric thickening of the left tentorium suspicious for a layering subdural hematoma.  4. Mild left frontal scalp soft tissue swelling without evidence of an underlying calvarial fracture.  5. Multilevel degenerative changes of the cervical spine without evidence of a fracture.    Call Back:  I discussed this case with Dr. Moulton at 6:02 AM on 12/1/2020.  Hospital policies for call back including read back policy were followed. The verbal communication call back supplements this written report.      HAILEY CARO MD; Attending Radiologist  This document has been electronically signed. Dec  1 2020  6:06AM    EXAM:  CT BRAIN                            PROCEDURE DATE:  12/02/2020          INTERPRETATION:  Exam Date: 12/2/2020 10:10 AM    CT head without IV contrast    CLINICAL INFORMATION: follwo up    SAH, IVH    TECHNIQUE: Contiguous axial 5 mm sections were obtained through the head.    COMPARISON: CT head 12/1/2020    FINDINGS:  Reidentified is a 6 mm focus of hyperattenuation in the left ambient cistern versus the posterior lateral aspect of the left cerebral peduncle, may reflect a stable smallfocus of subarachnoid hemorrhage versus a cavernoma, unchanged. Very minimal layering hemorrhage in the lateral ventricles has decreased since prior exam. There are multiple chronic lacunar infarcts in the right basal ganglia. The ventricles and sulci are prominent, compatible with age-related generalized cerebral volume loss.   There is no CT evidence for acute cerebral cortical infarct.  There is periventricular and subcortical white matter hypoattenuation,  most compatible with chronic microvascular ischemic changes.   No mass effect is found in the brain.  There is no midline shift or herniation pattern.      Small amount of mucosal inflammation is present in the visualized portion of the right maxillary sinus.    IMPRESSION:    Reidentified is a 6 mm focus of hyperattenuation in the left ambient cistern versus the posterior lateral aspect of the left cerebral peduncle, may reflect a stable small focus of subarachnoid hemorrhage versus a cavernoma, unchanged. Very minimal layering hemorrhage in the lateral ventricles has decreased since prior exam. There are multiple chronic lacunar infarcts in the right basal ganglia.    Rest of the chronic changes as above.      EVON ARCHER MD; Attending Radiologist    
Patient is a 88y old  Male who presents with a chief complaint of     HPI: 89 yo male brought to ER by EMS after fall at home. Pt states he was walking with walker and "slid" down to his buttock. Pt denies hitting his head. 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 Pt states he had been sliding down" frequently. He was noted to have old ecchymosis to left side of face and scalp when asked about it pt states he does not recall hitting his head a head Ct was ordering by the ER physician after his INR was foudn to be at 6.20. head Ct shows a small amount of layering IVH and a small area of hyperintensity in the left midbrain. Pt is a poor historian and is unclear why he is on full dose AC.       12/3- rpt head CT stable, awaiting MRI +/- to r/o cavernoma   12/5 - Patient seen.  MR +/- done overnight due to +Covid    PAST MEDICAL & SURGICAL HISTORY:  Cerebrovascular accident (CVA), unspecified mechanism        FAMILY HISTORY:      Social Hx:  Nonsmoker, no drug or alcohol use    MEDICATIONS  (STANDING):       Allergies    No Known Allergies    Intolerances        ROS: Pertinent positives in HPI, all other ROS were reviewed and are negative.      Vital Signs Last 24 Hrs  T(C): 36.4 (05 Dec 2020 09:01), Max: 37.1 (04 Dec 2020 17:39)  T(F): 97.6 (05 Dec 2020 09:01), Max: 98.8 (04 Dec 2020 17:39)  HR: 71 (05 Dec 2020 09:01) (60 - 71)  BP: 140/70 (05 Dec 2020 09:01) (130/84 - 163/72)  BP(mean): --  RR: 20 (05 Dec 2020 09:01) (20 - 20)  SpO2: 94% (05 Dec 2020 09:01) (94% - 95%)    MEDICATIONS  (STANDING):  atorvastatin 20 milliGRAM(s) Oral at bedtime  finasteride 5 milliGRAM(s) Oral daily  pantoprazole    Tablet 40 milliGRAM(s) Oral daily  sertraline 50 milliGRAM(s) Oral daily  tamsulosin 0.4 milliGRAM(s) Oral at bedtime    MEDICATIONS  (PRN):  acetaminophen   Tablet .. 650 milliGRAM(s) Oral every 4 hours PRN Temp greater or equal to 38C (100.4F), Mild Pain (1 - 3)  ondansetron Injectable 4 milliGRAM(s) IV Push every 6 hours PRN Nausea      Constitutional: awake and alert. [place]  HEENT: +large area of old ecchymosis noted to left temporal and parietal scalp improving, PERRLA, EOMI,   Neck: Supple. no pain to palpation or ROM   Respiratory: Breath sounds are clear bilaterally  Cardiovascular: S1 and S2, regular  rhythm  Gastrointestinal: soft, nontender  Extremities:  ana LE pitting edema and skin discoloration   Skin: multiple areas of ecchymosis ana UE l>R, head    Neurological exam:  HF: A x O x 3 [place] confused to timeline of events,. Appropriately interactive, normal affect. Speech fluent, No Aphasia or paraphasic errors.   CN: [place], EOMI, VFF, facial sensation normal, no NLFD, tongue midline  Motor: No pronator drift, MILLER x4 antigravity strong, LUE distal contracted, + tremor  Sens: Intact to light touch  Gait/Balance: /Cannot test      Radiology:  - CT Head:    EXAM:  CT CERVICAL SPINE                          EXAM:  CT BRAIN                            PROCEDURE DATE:  12/01/2020          INTERPRETATION:  CLINICAL INFORMATION: Status post fall on Coumadin.    TECHNIQUE: CT of the head was performed in multiple axial sections from the base of the skull to the vertex with coronal and sagittal reformats.  CT of the cervical spine was performed in bone and soft tissue windows with coronal and sagittal reformats. No intravenous contrast was administered. Beam hardening artifact slightly obscures evaluation of the posterior fossa and brainstem.    COMPARISON: No similar prior studies are available for comparison.    FINDINGS:    Head:  Parenchymal volume loss is noted with prominent ventricles and sulci. Chronic microvascular ischemic changes are identified. Gliosis and encephalomalacia is seen in the right corona radiata extending into the external capsule likely sequela of prior infarction. No acute territorial infarct is demonstrated.    A small amount of hemorrhage is layering in the lateral ventricles, right greater than left. A focus of high attenuation measuring 6 mm is seen adjacent to or within the left posterolateral aspect of the midbrain likely representing subarachnoid hemorrhage; however, a cavernoma could also be considered. Mild asymmetric thickening of the left tentorium is seen suspicious for a layering subdural hematoma.    Evaluation of the osseous structures with the appropriate window appears unremarkable. Vascular calcifications are noted. Sequela of left lens surgery is seen. Moderate mucosal thickening is seen in the right maxillary sinus. Mild mucosal thickening is seen in the left ethmoid sinus. The remaining visualized paranasal sinuses and mastoid air cells are clear. Mild left frontal scalp soft tissue swelling is noted.    Cervical spine:  Bones: Straightening of the normal cervical lordosis is seen which is likely due to muscle spasm versus patient positioning. The cervical vertebral body heights and alignment are maintained. No fracture or spondylolisthesis is demonstrated. Moderate disc space narrowing and marginal osteophyte formation is seen at C4-C5, C5-C6 and C6-C7. Multilevel posterior disc osteophyte complexes are seen without evidence of severe spinal canal stenosis. Multilevel neural foraminal stenosis is noted.    Soft tissues: The prevertebral soft tissues are unremarkable. A 7 mm right thyroid lobe nodule is noted.    Lung apices: Clear.    IMPRESSION:  1. Small amount of layering hemorrhage in the lateral ventricles, left greater than right.  2. Focus of high attenuation adjacent to or within the left posterolateral aspect of the midbrain likely representing subarachnoid hemorrhage; however, a cavernoma could also be considered.  3. Mild asymmetric thickening of the left tentorium suspicious for a layering subdural hematoma.  4. Mild left frontal scalp soft tissue swelling without evidence of an underlying calvarial fracture.  5. Multilevel degenerative changes of the cervical spine without evidence of a fracture.    Call Back:  I discussed this case with Dr. Moulton at 6:02 AM on 12/1/2020.  Hospital policies for call back including read back policy were followed. The verbal communication call back supplements this written report.      HAILEY CARO MD; Attending Radiologist  This document has been electronically signed. Dec  1 2020  6:06AM    EXAM:  CT BRAIN                            PROCEDURE DATE:  12/02/2020          INTERPRETATION:  Exam Date: 12/2/2020 10:10 AM    CT head without IV contrast    CLINICAL INFORMATION: follwo up    SAH, IVH    TECHNIQUE: Contiguous axial 5 mm sections were obtained through the head.    COMPARISON: CT head 12/1/2020    FINDINGS:  Reidentified is a 6 mm focus of hyperattenuation in the left ambient cistern versus the posterior lateral aspect of the left cerebral peduncle, may reflect a stable smallfocus of subarachnoid hemorrhage versus a cavernoma, unchanged. Very minimal layering hemorrhage in the lateral ventricles has decreased since prior exam. There are multiple chronic lacunar infarcts in the right basal ganglia. The ventricles and sulci are prominent, compatible with age-related generalized cerebral volume loss.   There is no CT evidence for acute cerebral cortical infarct.  There is periventricular and subcortical white matter hypoattenuation,  most compatible with chronic microvascular ischemic changes.   No mass effect is found in the brain.  There is no midline shift or herniation pattern.      Small amount of mucosal inflammation is present in the visualized portion of the right maxillary sinus.    IMPRESSION:    Reidentified is a 6 mm focus of hyperattenuation in the left ambient cistern versus the posterior lateral aspect of the left cerebral peduncle, may reflect a stable small focus of subarachnoid hemorrhage versus a cavernoma, unchanged. Very minimal layering hemorrhage in the lateral ventricles has decreased since prior exam. There are multiple chronic lacunar infarcts in the right basal ganglia.    Rest of the chronic changes as above.      EVON ARCHER MD; Attending Radiologist    < from: MR Head No Cont Disc (12.04.20 @ 23:31) >  FINDINGS:  Limitations: Motion artifact degrades the images. Only DWI and ADC images were obtained.  Brain: 2 mm acute infarct in the right frontal lobe.  Cerebral ventricles: Normal. No ventriculomegaly.  Possible tiny bilateral frontal lobe subdural hemorrhages on image 46, series 5. 5 mm left quadrigeminalplate cistern subarachnoid hemorrhage image 41, series 5  The patient's known intraventricular hemorrhage is not well demonstrated on the limited sequences obtained    IMPRESSION:  Limited incomplete study.  Small acute infarct in the right frontal lobe measuring 2 mm.  Left quadrigeminal plate cistern subarachnoid hemorrhage. Possible thin bilateral frontal convexity subdural hemorrhages. Consider follow-up MRI with anesthesia sedation and/or follow-up noncontrast head CT.  No herniation pattern.  Preliminary report provided by [facility] Dr. Zac Krueger DO. The findings were verbally communicated via telephone conference with MARIBEL Garcia at 12:06 AM EST on 12/5/2020. The findings were acknowledged and understood.    < end of copied text >      
Patient is a 88y old  Male who presents with a chief complaint of Fall (07 Dec 2020 13:30)    HPI:  89 yo male brought to ER by EMS after fall at home. Pt states he was walking with walker and "slid" down to his buttock. Pt denies hitting his head. 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 Pt states he had been sliding down frequently. in ED ABC intact HD stabe. No headache no neck pain. no SOB, no fever. No chest or abdominal pain. Mild  left elbow pain abrasion no focal neurological complaint, Moves all extremities  He was noted to have old ecchymosis to left side of face and scalp when asked about it pt states he does not recall hitting his head a head Ct was ordering by the ER physician after his INR was found to be at 6.20. Poor historian, very [place]. (01 Dec 2020 10:00)  12/9: NAD, O2 sat stable, Neurologically stable.  ROS:.  [X] A ten-point review of systems was otherwise negative except as noted.  Systemic:	[ ] Fever	[ ] Chills	[ ] Night sweats    [ ] Fatigue	[ ] Other  [] Cardiovascular:  [] Pulmonary:  [] Renal/Urologic:  [] Gastrointestinal: abdominal pain, vomiting  [] Metabolic:  [] Neurologic:  [] Hematologic:  [] ENT:  [] Ophthalmologic:  [] Musculoskeletal:    [ ] Due to altered mental status/intubation, subjective information were not able to be obtained from the patient. History was obtained, to the extent possible, from review of the chart and collateral sources of information.    PAST MEDICAL & SURGICAL HISTORY:  Cerebrovascular accident (CVA), unspecified mechanism      FAMILY HISTORY:    NC  Social history:     Alcohol: Denied  Smoking: Denied  Drug Use: Denied        Vital Signs Last 24 Hrs  T(C): 36.7 (09 Dec 2020 09:04), Max: 37.3 (08 Dec 2020 21:00)  T(F): 98.1 (09 Dec 2020 09:04), Max: 99.1 (08 Dec 2020 21:00)  HR: 78 (09 Dec 2020 09:04) (78 - 100)  BP: 121/68 (09 Dec 2020 09:04) (117/64 - 146/90)  BP(mean): --  RR: 18 (09 Dec 2020 09:04) (18 - 18)  SpO2: 93% (09 Dec 2020 09:04) (92% - 94%)  PHYSICAL EXAM:  Constitutional: NAD, GCS: 15/15  AOX2  Eyes:  WNL  ENMT:  WNL  Neck:  WNL, non tender  Back: Non tender  Respiratory: CTABL  Cardiovascular:  S1+S2+0  Gastrointestinal: Soft, ND , NT  Genitourinary:  WNL  Extremities: NV intact, left arm contusion stable.  Vascular:  Intact  Neurological: No focal neurological deficit,  CN, motor and sensory system grossly intact.  Skin: WNL      Labs:                    Radiology:    < from: MR Head No Cont Disc (12.04.20 @ 23:31) >  IMPRESSION:  Limited incomplete study.  Small acute infarct in the right frontal lobe measuring 2 mm.  Left quadrigeminal plate cistern subarachnoid hemorrhage. Possible thin bilateral frontal convexity subdural hemorrhages. Consider follow-up MRI with anesthesia sedation and/or follow-up noncontrast head CT.  No herniation pattern.  Preliminary report provided by [facility] Dr. Zac Krueger DO. The findings were verbally communicated via telephone conference with MARIBEL Garcia at 12:06 AM EST on 12/5/2020. The findings were acknowledged and understood.      < end of copied text >  
Patient is a 88y old  Male who presents with a chief complaint of S/p fall (02 Dec 2020 10:47)    87 yo male brought to ER by EMS after fall at home. Pt states he was walking with walker and "slid" down to his buttock. Pt denies hitting his head. 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 Pt states he had been sliding down frequently. in ED ABC intact HD stabe. No headache no neck pain. no SOB, no fever. No chest or abdominal pain. Mild  left elbow pain abrasion no focal neurological complaint, Moves all extremities  He was noted to have old ecchymosis to left side of face and scalp when asked about it pt states he does not recall hitting his head a head Ct was ordering by the ER physician after his INR was found to be at 6.20. Poor historian, very [place]. (01 Dec 2020 10:00)  12/2: Pt seen and examined, awake, alert, fever better, no SOB, O2 sat WNL. Neurologically stable. Mild left elbow pain, Almost full range of motion.   ROS:      SpO2: 95% (02 Dec 2020 08:00) (77% - 99%)  PHYSICAL EXAM:  Constitutional: NAD, GCS: 15/15  AOX2  Eyes:  WNL  ENMT:  WNL  Neck:  WNL, non tender, facial ecchymosis stable,   Back: Non tender  Respiratory: CTABL  Cardiovascular:  S1+S2+0  Gastrointestinal: Soft, ND , NT  Genitourinary:  WNL  Extremities: NV intact, left elbow abrasion, swelling stable.   Vascular:  Intact  Neurological: No focal neurological deficit,  CN, motor and sensory system grossly intact.  Skin: WNL      MEDICATIONS  (STANDING):  atorvastatin 20 milliGRAM(s) Oral at bedtime  cefTRIAXone Injectable. 1000 milliGRAM(s) IV Push every 24 hours  dexAMETHasone  Injectable 6 milliGRAM(s) IV Push daily  finasteride 5 milliGRAM(s) Oral daily  pantoprazole    Tablet 40 milliGRAM(s) Oral daily  sertraline 50 milliGRAM(s) Oral daily  tamsulosin 0.4 milliGRAM(s) Oral at bedtime    88 y old male on A/C fell off his bed, c/O left arm pain, no LOC, found to have IVH. SAH. Possible left epicondylar fracture. COVID positive.    S/p fall with SAH and IVH  Off Coumadin  PT eval/ OOB    #COVID positive  dc Dexa    #UTI  dc abx, asymptomatic abnormal uti    #Afib  off coumadin 2 to SAH  Rate controlled for now    # r/o LUE fractire  CT elbow; was cancelled many times; d/w nurse; should be done        
Patient is a 88y old  Male who presents with a chief complaint of S/p fall (02 Dec 2020 10:47)    HPI:  89 yo male brought to ER by EMS after fall at home. Pt states he was walking with walker and "slid" down to his buttock. Pt denies hitting his head. 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 Pt states he had been sliding down frequently. in ED ABC intact HD stabe. No headache no neck pain. no SOB, no fever. No chest or abdominal pain. Mild  left elbow pain abrasion no focal neurological complaint, Moves all extremities  He was noted to have old ecchymosis to left side of face and scalp when asked about it pt states he does not recall hitting his head a head Ct was ordering by the ER physician after his INR was found to be at 6.20. Poor historian, very [place]. (01 Dec 2020 10:00)  12/2: Pt seen and examined, awake, alert, fever better, no SOB, O2 sat WNL. Neurologically stable. Mild left elbow pain, Almost full range of motion.   ROS:.  [] A ten-point review of systems was otherwise negative except as noted.  Systemic:	[ ] Fever	[ ] Chills	[ ] Night sweats    [ ] Fatigue	[ ] Other  [] Cardiovascular:  [] Pulmonary:  [] Renal/Urologic:  [] Gastrointestinal: abdominal pain, vomiting  [] Metabolic:  [] Neurologic:  [] Hematologic:  [] ENT:  [] Ophthalmologic:  [] Musculoskeletal:    [X ] Due to altered mental status/intubation, subjective information were not able to be obtained from the patient. History was obtained, to the extent possible, from review of the chart and collateral sources of information.( TBI)    PAST MEDICAL & SURGICAL HISTORY:  Cerebrovascular accident (CVA), unspecified mechanism      FAMILY HISTORY:    NC  Social history:     Alcohol: Denied  Smoking: Denied  Drug Use: Denied        Vital Signs Last 24 Hrs  T(C): 36.7 (02 Dec 2020 10:24), Max: 37.1 (01 Dec 2020 13:00)  T(F): 98 (02 Dec 2020 10:24), Max: 98.8 (01 Dec 2020 13:00)  HR: 67 (02 Dec 2020 08:00) (48 - 96)  BP: 124/82 (02 Dec 2020 08:00) (117/92 - 159/87)  BP(mean): 90 (02 Dec 2020 08:00) (71 - 109)  RR: 24 (02 Dec 2020 08:00) (19 - 34)  SpO2: 95% (02 Dec 2020 08:00) (77% - 99%)  PHYSICAL EXAM:  Constitutional: NAD, GCS: 15/15  AOX2  Eyes:  WNL  ENMT:  WNL  Neck:  WNL, non tender, facial ecchymosis stable,   Back: Non tender  Respiratory: CTABL  Cardiovascular:  S1+S2+0  Gastrointestinal: Soft, ND , NT  Genitourinary:  WNL  Extremities: NV intact, left elbow abrasion, swelling stable.   Vascular:  Intact  Neurological: No focal neurological deficit,  CN, motor and sensory system grossly intact.  Skin: WNL        Labs:                          12.3   5.56  )-----------( 131      ( 02 Dec 2020 06:39 )             37.1       12-02    140  |  111<H>  |  26<H>  ----------------------------<  89  4.0   |  22  |  1.08    Ca    8.4<L>      02 Dec 2020 09:54    TPro  6.8  /  Alb  2.8<L>  /  TBili  1.1  /  DBili  x   /  AST  36  /  ALT  18  /  AlkPhos  59  12-02      PT/INR - ( 02 Dec 2020 06:39 )   PT: 17.4 sec;   INR: 1.52 ratio         PTT - ( 02 Dec 2020 06:39 )  PTT:33.3 sec    Radiology:    < from: CT Head No Cont (12.02.20 @ 10:10) >    IMPRESSION:    Reidentified is a 6 mm focus of hyperattenuation in the left ambient cistern versus the posterior lateral aspect of the left cerebral peduncle, may reflect a stable small focus of subarachnoid hemorrhage versus a cavernoma, unchanged. Very minimal layering hemorrhage in the lateral ventricles has decreased since prior exam. There are multiple chronic lacunar infarcts in the right basal ganglia.    Rest of the chronic changes as above.      < end of copied text >    
Patient is a 88y old  Male who presents with a chief complaint of s/p fall (05 Dec 2020 11:56)    HPI:  87 yo male brought to ER by EMS after fall at home. Pt states he was walking with walker and "slid" down to his buttock. Pt denies hitting his head. 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 Pt states he had been sliding down frequently. in ED ABC intact HD stabe. No headache no neck pain. no SOB, no fever. No chest or abdominal pain. Mild  left elbow pain abrasion no focal neurological complaint, Moves all extremities  He was noted to have old ecchymosis to left side of face and scalp when asked about it pt states he does not recall hitting his head a head Ct was ordering by the ER physician after his INR was found to be at 6.20. Poor historian, very [place]. (01 Dec 2020 10:00)  12/7: Pt seen and examined, MS at base line, CT stable, pain well controlled no fever.  ROS:.  [] A ten-point review of systems was otherwise negative except as noted.  Systemic:	[ ] Fever	[ ] Chills	[ ] Night sweats    [ ] Fatigue	[ ] Other  [] Cardiovascular:  [] Pulmonary:  [] Renal/Urologic:  [] Gastrointestinal: abdominal pain, vomiting  [] Metabolic:  [] Neurologic:  [] Hematologic:  [] ENT:  [] Ophthalmologic:  [] Musculoskeletal:    [ X] Due to altered mental status/intubation, subjective information were not able to be obtained from the patient. History was obtained, to the extent possible, from review of the chart and collateral sources of information( TBI)    PAST MEDICAL & SURGICAL HISTORY:  Cerebrovascular accident (CVA), unspecified mechanism      FAMILY HISTORY:    NC  Social history:     Alcohol: Denied  Smoking: Denied  Drug Use: Denied        Vital Signs Last 24 Hrs  T(C): 36.3 (07 Dec 2020 08:22), Max: 36.6 (06 Dec 2020 23:26)  T(F): 97.4 (07 Dec 2020 08:22), Max: 97.9 (06 Dec 2020 23:26)  HR: 86 (07 Dec 2020 08:22) (68 - 86)  BP: 132/85 (07 Dec 2020 08:22) (132/85 - 155/78)  BP(mean): --  RR: 16 (07 Dec 2020 08:22) (16 - 16)  SpO2: 97% (07 Dec 2020 08:22) (95% - 97%)  PHYSICAL EXAM:  Constitutional: NAD, GCS: 15/15  AOX2  Eyes:  WNL  ENMT:  WNL, fascial contusions stable,   Neck:  WNL, non tender  Back: Non tender  Respiratory: CTABL  Cardiovascular:  S1+S2+0  Gastrointestinal: Soft, ND , NT  Genitourinary:  WNL  Extremities: NV intact, left arm swelling stable.   Vascular:  Intact  Neurological: No focal neurological deficit,  CN, motor and sensory system grossly intact.  Skin: WNL  Musculoskeletal: WNL      Labs:      no new labs    Radiology:    < from: MR Head No Cont Disc (12.04.20 @ 23:31) >  IMPRESSION:  Limited incomplete study.  Small acute infarct in the right frontal lobe measuring 2 mm.  Left quadrigeminal plate cistern subarachnoid hemorrhage. Possible thin bilateral frontal convexity subdural hemorrhages. Consider follow-up MRI with anesthesia sedation and/or follow-up noncontrast head CT.  No herniation pattern.  Preliminary report provided by [facility] Dr. Zac Krueger DO. The findings were verbally communicated via telephone conference with MARIBEL Garcia at 12:06 AM EST on 12/5/2020. The findings were acknowledged and understood.      < end of copied text >      
87 yo male brought to ER by EMS after fall at home. Pt states he was walking with walker and "slid" down to his buttock. Pt denies hitting his head. 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 Pt states he had been sliding down frequently. in ED ABC intact HD stabe. No headache no neck pain. no SOB, no fever. No chest or abdominal pain. Mild  left elbow pain abrasion no focal neurological complaint, Moves all extremities  He was noted to have old ecchymosis to left side of face and scalp when asked about it pt states he does not recall hitting his head a head Ct was ordering by the ER physician after his INR was found to be at 6.20. Poor historian, very [place]. (01 Dec 2020 10:00)    12/2: Pt seen and examined, awake, alert, fever better, no SOB, O2 sat WNL. Neurologically stable. Mild left elbow pain, Almost full range of motion.  12/3 no acutre events, CT elbow negative for fracture, still pending MRI of brain for neurosurgical evaluation.  12/4: pt evaled sitting up in bed. no overnight events. MRI scheduled for today   12/7: pt seen and examined sitting up in bed. no overnight events. denies pain or discomfort   12/9: pt seen and examined sitting up in bed while eating. denies pain or discomfort. no n/v. pending placement   12/10: pt seen and examined sitting up in bed. no pain or discomfort. covid tested positive today otherwise status quo   12/11: pt seen and examined sitting up in bed. no pain or discomfort noted by pt. pending retest on 12/13 12/12/20: Patient seen and examined. He denies any complaints. Waiting for rehab bed once COVID-19 negative.  12/13/20: COVID-19 positive today. No new issues  12/14: covid + yesterday. otherwise no complaints.   12/15: pt denies pain or discomfort today. no new complaints. covid retesting today   12/16: pt resting in bed comfortably. no new complaints. covid tested + yesterday   12/17: pt sitting up in bed after breakfast. no new complaints. awaiting covid retesting.     Vital Signs Last 24 Hrs  T(C): 36.1 (17 Dec 2020 07:35), Max: 36.5 (16 Dec 2020 21:34)  T(F): 97 (17 Dec 2020 07:35), Max: 97.7 (16 Dec 2020 21:34)  HR: 70 (17 Dec 2020 07:35) (67 - 70)  BP: 139/76 (17 Dec 2020 07:35) (118/77 - 139/76)  BP(mean): --  RR: 18 (17 Dec 2020 07:35) (18 - 18)  SpO2: 96% (17 Dec 2020 07:35) (96% - 97%) --- room air       PHYSICAL EXAM:    HEENT:  pupils equal and reactive, EOMI, no oropharyngeal lesions, erythema, exudates, oral thrush, left orbital hematoma   NECK:   supple, no carotid bruits, no palpable lymph nodes, no thyromegaly  CV:  +S1, +S2, regular, no murmurs or rubs  RESP:   lungs clear to auscultation bilaterally, no wheezing, rales, rhonchi, good air entry bilaterally  GI:  abdomen soft, non-tender, non-distended, normal BS, no bruits, no abdominal masses, no palpable masses  MSK:   normal muscle tone, no atrophy, no rigidity, no contractions  EXT:  no clubbing, no cyanosis, no edema, no calf pain, swelling or erythema. 5/5 strength   VASCULAR:  pulses equal and symmetric in the upper and lower extremities  NEURO:  AAOX2, no gross focal neurological deficits, follows commands, able to move extremities spontaneously  SKIN:  no ulcers, lesions or rashes        MEDICATIONS  (STANDING):  atorvastatin 20 milliGRAM(s) Oral at bedtime  finasteride 5 milliGRAM(s) Oral daily  pantoprazole    Tablet 40 milliGRAM(s) Oral daily  sertraline 50 milliGRAM(s) Oral daily  tamsulosin 0.4 milliGRAM(s) Oral at bedtime    MEDICATIONS  (PRN):  acetaminophen   Tablet .. 650 milliGRAM(s) Oral every 4 hours PRN Temp greater or equal to 38C (100.4F), Mild Pain (1 - 3)  ondansetron Injectable 4 milliGRAM(s) IV Push every 6 hours PRN Nausea    
87 yo male brought to ER by EMS after fall at home. Pt states he was walking with walker and "slid" down to his buttock. Pt denies hitting his head. 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 Pt states he had been sliding down frequently. in ED ABC intact HD stabe. No headache no neck pain. no SOB, no fever. No chest or abdominal pain. Mild  left elbow pain abrasion no focal neurological complaint, Moves all extremities  He was noted to have old ecchymosis to left side of face and scalp when asked about it pt states he does not recall hitting his head a head Ct was ordering by the ER physician after his INR was found to be at 6.20. Poor historian, very [place]. (01 Dec 2020 10:00)    12/2: Pt seen and examined, awake, alert, fever better, no SOB, O2 sat WNL. Neurologically stable. Mild left elbow pain, Almost full range of motion.  12/3 no acutre events, CT elbow negative for fracture, still pending MRI of brain for neurosurgical evaluation.  12/4: pt evaled sitting up in bed. no overnight events. MRI scheduled for today   12/7: pt seen and examined sitting up in bed. no overnight events. denies pain or discomfort   12/9: pt seen and examined sitting up in bed while eating. denies pain or discomfort. no n/v. pending placement   12/10: pt seen and examined sitting up in bed. no pain or discomfort. covid tested positive today otherwise status quo   12/11: pt seen and examined sitting up in bed. no pain or discomfort noted by pt. pending retest on 12/13 12/12/20: Patient seen and examined. He denies any complaints. Waiting for rehab bed once COVID-19 negative.  12/13/20: COVID-19 positive today. No new issues  12/14: covid + yesterday. otherwise no complaints.   12/15: pt denies pain or discomfort today. no new complaints. covid retesting today   12/16: pt resting in bed comfortably. no new complaints. covid tested + yesterday   12/17: pt sitting up in bed after breakfast. no new complaints. awaiting covid retesting.   12/18: pt seen and examined sitting up in bed. no new complaints.   12/19: pt sitting up in bed. no pain or discomfort noted. pending negative covid testing.   12/21: pt seen and examined sitting up in bed, resting. no discomfort or pain. pending negative covid swab.   12/22: pt seen and examined in bed. confused per baseline. no discomfort or pain.   12/23: pt lying in bed, confused and asking for his wife. denies discomfort or pain.   12/24: no complaints. awaiting negative covid testing. retest 12/25 12/25: sitting up in bed eating breakfast. tested negative today. retest tonight.   12/26/20: No new issues. COVID positive today  12/27/20: No new complaints.  12/28: sitting up, interactive. covid testing tonight     Vital Signs Last 24 Hrs  T(C): 36.3 (28 Dec 2020 09:08), Max: 36.4 (27 Dec 2020 17:03)  T(F): 97.4 (28 Dec 2020 09:08), Max: 97.5 (27 Dec 2020 17:03)  HR: 73 (28 Dec 2020 09:08) (52 - 74)  BP: 152/86 (28 Dec 2020 09:08) (126/58 - 152/86)  BP(mean): --  RR: 18 (28 Dec 2020 09:08) (16 - 18)  SpO2: 98% (28 Dec 2020 09:08) (97% - 99%)      PHYSICAL EXAM:    HEENT:  pupils equal and reactive, EOMI, no oropharyngeal lesions, erythema, exudates, oral thrush, scant left orbital hematoma   NECK:   supple, no carotid bruits, no palpable lymph nodes, no thyromegaly  CV:  +S1, +S2, regular, no murmurs or rubs  RESP:   lungs clear to auscultation bilaterally, no wheezing, rales, rhonchi, good air entry bilaterally  GI:  abdomen soft, non-tender, non-distended, normal BS, no bruits, no abdominal masses, no palpable masses  MSK:   normal muscle tone, no atrophy, no rigidity, no contractions  EXT:  no clubbing, no cyanosis, no edema, no calf pain, swelling or erythema. 5/5 strength   VASCULAR:  pulses equal and symmetric in the upper and lower extremities  NEURO:  AAOX2, no gross focal neurological deficits, follows commands, able to move extremities spontaneously  SKIN:  no ulcers, lesions. incontinence rash noted to perineum       MEDICATIONS  (STANDING):  atorvastatin 20 milliGRAM(s) Oral at bedtime  finasteride 5 milliGRAM(s) Oral daily  pantoprazole    Tablet 40 milliGRAM(s) Oral daily  sertraline 50 milliGRAM(s) Oral daily  tamsulosin 0.4 milliGRAM(s) Oral at bedtime    MEDICATIONS  (PRN):  acetaminophen   Tablet .. 650 milliGRAM(s) Oral every 4 hours PRN Temp greater or equal to 38C (100.4F), Mild Pain (1 - 3)  ondansetron Injectable 4 milliGRAM(s) IV Push every 6 hours PRN Nausea    
CC: Fall    · Subjective and Objective:   87 yo male brought to ER by EMS after fall at home. Pt states he was walking with walker and "slid" down to his buttock. Pt denies hitting his head. 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 Pt states he had been sliding down frequently. in ED ABC intact HD stabe. No headache no neck pain. no SOB, no fever. No chest or abdominal pain. Mild  left elbow pain abrasion no focal neurological complaint, Moves all extremities  He was noted to have old ecchymosis to left side of face and scalp when asked about it pt states he does not recall hitting his head a head Ct was ordering by the ER physician after his INR was found to be at 6.20. Poor historian, very [place]. (01 Dec 2020 10:00)    12/2: Pt seen and examined, awake, alert, fever better, no SOB, O2 sat WNL. Neurologically stable. Mild left elbow pain, Almost full range of motion.  12/3 no acutre events, CT elbow negative for fracture, still pending MRI of brain for neurosurgical evaluation.  12/4: pt evaled sitting up in bed. no overnight events. MRI scheduled for today   12/7: pt seen and examined sitting up in bed. no overnight events. denies pain or discomfort   12/9: pt seen and examined sitting up in bed while eating. denies pain or discomfort. no n/v. pending placement   12/10: pt seen and examined sitting up in bed. no pain or discomfort. covid tested positive today otherwise status quo   12/11: pt seen and examined sitting up in bed. no pain or discomfort noted by pt. pending retest on 12/13 12/12/20: Patient seen and examined. He denies any complaints. Waiting for rehab bed once COVID-19 negative.  12/13/20: COVID-19 positive today. No new issues  12/14: covid + yesterday. otherwise no complaints.   12/15: pt denies pain or discomfort today. no new complaints. covid retesting today   12/16: pt resting in bed comfortably. no new complaints. covid tested + yesterday   12/17: pt sitting up in bed after breakfast. no new complaints. awaiting covid retesting.   12/18: pt seen and examined sitting up in bed. no new complaints.   12/19: pt sitting up in bed. no pain or discomfort noted. pending negative covid testing.   12/21: pt seen and examined sitting up in bed, resting. no discomfort or pain. pending negative covid swab.   12/22: pt seen and examined in bed. confused per baseline. no discomfort or pain.   12/23: pt lying in bed, confused and asking for his wife. denies discomfort or pain.   12/24: no complaints. awaiting negative covid testing. retest 12/25 12/25: sitting up in bed eating breakfast. tested negative today. retest tonight.   12/26/20: No new issues. COVID positive today  12/27/20: No new complaints.  12/28: sitting up, interactive. covid testing tonight   12/29: sitting up in bed. covid + this AM.   12/30/20: Looks better today. No new issues. Calm and relaxed.   12/31/20: No new complaints.   1/1: No new events. Status quo.  1/2: Lying in bed, comfortable. Eating food.  No new events.   1/3: Comfortable. No overnight events. Awaiting neg screen.  1/4: No new events. Status quo.   1/5: NO new events, comfortable.  Afebrile.    REVIEW OF SYSTEMS: All other review of systems is negative unless indicated above.    Vital Signs Last 24 Hrs  T(C): 36.7 (04 Jan 2021 21:04), Max: 36.7 (04 Jan 2021 21:04)  T(F): 98 (04 Jan 2021 21:04), Max: 98 (04 Jan 2021 21:04)  HR: 70 (05 Jan 2021 09:30) (68 - 86)  BP: 110/71 (05 Jan 2021 09:30) (110/71 - 139/84)  BP(mean): --  RR: 18 (05 Jan 2021 09:30) (18 - 18)  SpO2: 95% (05 Jan 2021 09:30) (94% - 98%)    PHYSICAL EXAM:    HEENT:  pupils equal and reactive, EOMI, no oropharyngeal lesions, erythema, exudates, oral thrush, scant left orbital hematoma   NECK:   supple, no carotid bruits, no palpable lymph nodes, no thyromegaly  CV:  +S1, +S2, regular, no murmurs or rubs  RESP:   lungs clear to auscultation bilaterally, no wheezing, rales, rhonchi, good air entry bilaterally  GI:  abdomen soft, non-tender, non-distended, normal BS, no bruits, no abdominal masses, no palpable masses  MSK:   normal muscle tone, no atrophy, no rigidity, no contractions  EXT:  no clubbing, no cyanosis, no edema, no calf pain, swelling or erythema. 5/5 strength   VASCULAR:  pulses equal and symmetric in the upper and lower extremities  NEURO:  AAOX2, no gross focal neurological deficits, follows commands, able to move extremities spontaneously  SKIN:  no ulcers, lesions. incontinence rash noted to perineum     MEDICATIONS  (STANDING):  atorvastatin 20 milliGRAM(s) Oral at bedtime  finasteride 5 milliGRAM(s) Oral daily  nystatin Powder 1 Application(s) Topical two times a day  pantoprazole    Tablet 40 milliGRAM(s) Oral daily  sertraline 50 milliGRAM(s) Oral daily  tamsulosin 0.4 milliGRAM(s) Oral at bedtime    MEDICATIONS  (PRN):  acetaminophen   Tablet .. 650 milliGRAM(s) Oral every 4 hours PRN Temp greater or equal to 38C (100.4F), Mild Pain (1 - 3)  ondansetron Injectable 4 milliGRAM(s) IV Push every 6 hours PRN Nausea        Assessment and Plan:  88 y old male on A/C fell off his bed, c/O left arm pain, no LOC, found to have IVH. SAH. Possible left epicondylar fracture. COVID positive.    # acute subdural hematoma and intraventricular hemorrhage S/p fall --- stable   - no neuro deficits   - off coumadin    - neurosurgery no interventions   - PT eval/ OOB / requires bartolo lift and full assist OOB     #COVID positive --- acute illness resolved  - retest 12/29  - s/p dexamethasone     #UTI  - asymptomatic UTI no need for abx at this time.     #Afib  - Rate controlled for now  - no AC at this time given SAH and IVH     # r/o LUE fracture  - degenerative changes without acute fracture.     dispo: pending sub acute rehab placement , requires 2 negative swabs.   neg COVID 1/1  pos COVID 1/2  pos COVID 1/5             
CC: Fall    · Subjective and Objective:   87 yo male brought to ER by EMS after fall at home. Pt states he was walking with walker and "slid" down to his buttock. Pt denies hitting his head. 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 Pt states he had been sliding down frequently. in ED ABC intact HD stabe. No headache no neck pain. no SOB, no fever. No chest or abdominal pain. Mild  left elbow pain abrasion no focal neurological complaint, Moves all extremities  He was noted to have old ecchymosis to left side of face and scalp when asked about it pt states he does not recall hitting his head a head Ct was ordering by the ER physician after his INR was found to be at 6.20. Poor historian, very [place]. (01 Dec 2020 10:00)    12/2: Pt seen and examined, awake, alert, fever better, no SOB, O2 sat WNL. Neurologically stable. Mild left elbow pain, Almost full range of motion.  12/3 no acutre events, CT elbow negative for fracture, still pending MRI of brain for neurosurgical evaluation.  12/4: pt evaled sitting up in bed. no overnight events. MRI scheduled for today   12/7: pt seen and examined sitting up in bed. no overnight events. denies pain or discomfort   12/9: pt seen and examined sitting up in bed while eating. denies pain or discomfort. no n/v. pending placement   12/10: pt seen and examined sitting up in bed. no pain or discomfort. covid tested positive today otherwise status quo   12/11: pt seen and examined sitting up in bed. no pain or discomfort noted by pt. pending retest on 12/13 12/12/20: Patient seen and examined. He denies any complaints. Waiting for rehab bed once COVID-19 negative.  12/13/20: COVID-19 positive today. No new issues  12/14: covid + yesterday. otherwise no complaints.   12/15: pt denies pain or discomfort today. no new complaints. covid retesting today   12/16: pt resting in bed comfortably. no new complaints. covid tested + yesterday   12/17: pt sitting up in bed after breakfast. no new complaints. awaiting covid retesting.   12/18: pt seen and examined sitting up in bed. no new complaints.   12/19: pt sitting up in bed. no pain or discomfort noted. pending negative covid testing.   12/21: pt seen and examined sitting up in bed, resting. no discomfort or pain. pending negative covid swab.   12/22: pt seen and examined in bed. confused per baseline. no discomfort or pain.   12/23: pt lying in bed, confused and asking for his wife. denies discomfort or pain.   12/24: no complaints. awaiting negative covid testing. retest 12/25 12/25: sitting up in bed eating breakfast. tested negative today. retest tonight.   12/26/20: No new issues. COVID positive today  12/27/20: No new complaints.  12/28: sitting up, interactive. covid testing tonight   12/29: sitting up in bed. covid + this AM.   12/30/20: Looks better today. No new issues. Calm and relaxed.   12/31/20: No new complaints.   1/1: No new events. Status quo.  1/2: Lying in bed, comfortable. Eating food.  No new events.   1/3: Comfortable. No overnight events. Awaiting neg screen.  1/4: No new events. Status quo.   1/5: NO new events, comfortable.  Afebrile.  1/6: Status quo, no new events. No fever, chills, n, v.    REVIEW OF SYSTEMS: All other review of systems is negative unless indicated above.    Vital Signs Last 24 Hrs  T(C): 36.1 (06 Jan 2021 07:45), Max: 36.6 (05 Jan 2021 21:13)  T(F): 97 (06 Jan 2021 07:45), Max: 97.9 (05 Jan 2021 21:13)  HR: 76 (06 Jan 2021 07:45) (60 - 78)  BP: 132/66 (06 Jan 2021 07:45) (113/74 - 156/86)  BP(mean): --  RR: 18 (06 Jan 2021 07:45) (18 - 18)  SpO2: 98% (06 Jan 2021 07:45) (96% - 98%)    PHYSICAL EXAM:    HEENT:  pupils equal and reactive, EOMI, no oropharyngeal lesions, erythema, exudates, oral thrush, scant left orbital hematoma   NECK:   supple, no carotid bruits, no palpable lymph nodes, no thyromegaly  CV:  +S1, +S2, regular, no murmurs or rubs  RESP:   lungs clear to auscultation bilaterally, no wheezing, rales, rhonchi, good air entry bilaterally  GI:  abdomen soft, non-tender, non-distended, normal BS, no bruits, no abdominal masses, no palpable masses  MSK:   normal muscle tone, no atrophy, no rigidity, no contractions  EXT:  no clubbing, no cyanosis, no edema, no calf pain, swelling or erythema. 5/5 strength   VASCULAR:  pulses equal and symmetric in the upper and lower extremities  NEURO:  AAOX2, no gross focal neurological deficits, follows commands, able to move extremities spontaneously  SKIN:  no ulcers, lesions. incontinence rash noted to perineum     MEDICATIONS  (STANDING):  atorvastatin 20 milliGRAM(s) Oral at bedtime  finasteride 5 milliGRAM(s) Oral daily  nystatin Powder 1 Application(s) Topical two times a day  pantoprazole    Tablet 40 milliGRAM(s) Oral daily  sertraline 50 milliGRAM(s) Oral daily  tamsulosin 0.4 milliGRAM(s) Oral at bedtime    MEDICATIONS  (PRN):  acetaminophen   Tablet .. 650 milliGRAM(s) Oral every 4 hours PRN Temp greater or equal to 38C (100.4F), Mild Pain (1 - 3)  ondansetron Injectable 4 milliGRAM(s) IV Push every 6 hours PRN Nausea        Assessment and Plan:  88 y old male on A/C fell off his bed, c/O left arm pain, no LOC, found to have IVH. SAH. Possible left epicondylar fracture. COVID positive.    # acute subdural hematoma and intraventricular hemorrhage S/p fall --- stable   - no neuro deficits   - off coumadin    - neurosurgery no interventions   - PT eval/ OOB / requires bartolo lift and full assist OOB     #COVID positive --- acute illness resolved  - retest 12/29  - s/p dexamethasone     #UTI  - asymptomatic UTI no need for abx at this time.     #Afib  - Rate controlled for now  - no AC at this time given SAH and IVH     # r/o LUE fracture  - degenerative changes without acute fracture.     dispo: pending sub acute rehab placement , requires 2 negative swabs.   neg COVID 1/1  pos COVID 1/2  pos COVID 1/5  COVID test now, anticipate discharge to rehab tomorrow.      
CC: Fall    · Subjective and Objective:   89 yo male brought to ER by EMS after fall at home. Pt states he was walking with walker and "slid" down to his buttock. Pt denies hitting his head. 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 Pt states he had been sliding down frequently. in ED ABC intact HD stabe. No headache no neck pain. no SOB, no fever. No chest or abdominal pain. Mild  left elbow pain abrasion no focal neurological complaint, Moves all extremities  He was noted to have old ecchymosis to left side of face and scalp when asked about it pt states he does not recall hitting his head a head Ct was ordering by the ER physician after his INR was found to be at 6.20. Poor historian, very [place]. (01 Dec 2020 10:00)    12/2: Pt seen and examined, awake, alert, fever better, no SOB, O2 sat WNL. Neurologically stable. Mild left elbow pain, Almost full range of motion.  12/3 no acutre events, CT elbow negative for fracture, still pending MRI of brain for neurosurgical evaluation.  12/4: pt evaled sitting up in bed. no overnight events. MRI scheduled for today   12/7: pt seen and examined sitting up in bed. no overnight events. denies pain or discomfort   12/9: pt seen and examined sitting up in bed while eating. denies pain or discomfort. no n/v. pending placement   12/10: pt seen and examined sitting up in bed. no pain or discomfort. covid tested positive today otherwise status quo   12/11: pt seen and examined sitting up in bed. no pain or discomfort noted by pt. pending retest on 12/13 12/12/20: Patient seen and examined. He denies any complaints. Waiting for rehab bed once COVID-19 negative.  12/13/20: COVID-19 positive today. No new issues  12/14: covid + yesterday. otherwise no complaints.   12/15: pt denies pain or discomfort today. no new complaints. covid retesting today   12/16: pt resting in bed comfortably. no new complaints. covid tested + yesterday   12/17: pt sitting up in bed after breakfast. no new complaints. awaiting covid retesting.   12/18: pt seen and examined sitting up in bed. no new complaints.   12/19: pt sitting up in bed. no pain or discomfort noted. pending negative covid testing.   12/21: pt seen and examined sitting up in bed, resting. no discomfort or pain. pending negative covid swab.   12/22: pt seen and examined in bed. confused per baseline. no discomfort or pain.   12/23: pt lying in bed, confused and asking for his wife. denies discomfort or pain.   12/24: no complaints. awaiting negative covid testing. retest 12/25 12/25: sitting up in bed eating breakfast. tested negative today. retest tonight.   12/26/20: No new issues. COVID positive today  12/27/20: No new complaints.  12/28: sitting up, interactive. covid testing tonight   12/29: sitting up in bed. covid + this AM.   12/30/20: Looks better today. No new issues. Calm and relaxed.   12/31/20: No new complaints.   1/1: No new events. Status quo.  1/2: Lying in bed, comfortable. Eating food.  No new events.   1/3: Comfortable. No overnight events. Awaiting neg screen.    REVIEW OF SYSTEMS: All other review of systems is negative unless indicated above.    Vital Signs Last 24 Hrs  T(C): 36.3 (03 Jan 2021 08:42), Max: 36.3 (03 Jan 2021 08:42)  T(F): 97.3 (03 Jan 2021 08:42), Max: 97.3 (03 Jan 2021 08:42)  HR: 82 (03 Jan 2021 08:42) (75 - 82)  BP: 126/80 (03 Jan 2021 08:42) (112/73 - 126/80)  BP(mean): --  RR: 17 (03 Jan 2021 08:42) (17 - 18)  SpO2: 100% (03 Jan 2021 08:42) (96% - 100%)    PHYSICAL EXAM:    HEENT:  pupils equal and reactive, EOMI, no oropharyngeal lesions, erythema, exudates, oral thrush, scant left orbital hematoma   NECK:   supple, no carotid bruits, no palpable lymph nodes, no thyromegaly  CV:  +S1, +S2, regular, no murmurs or rubs  RESP:   lungs clear to auscultation bilaterally, no wheezing, rales, rhonchi, good air entry bilaterally  GI:  abdomen soft, non-tender, non-distended, normal BS, no bruits, no abdominal masses, no palpable masses  MSK:   normal muscle tone, no atrophy, no rigidity, no contractions  EXT:  no clubbing, no cyanosis, no edema, no calf pain, swelling or erythema. 5/5 strength   VASCULAR:  pulses equal and symmetric in the upper and lower extremities  NEURO:  AAOX2, no gross focal neurological deficits, follows commands, able to move extremities spontaneously  SKIN:  no ulcers, lesions. incontinence rash noted to perineum     MEDICATIONS  (STANDING):  atorvastatin 20 milliGRAM(s) Oral at bedtime  finasteride 5 milliGRAM(s) Oral daily  nystatin Powder 1 Application(s) Topical two times a day  pantoprazole    Tablet 40 milliGRAM(s) Oral daily  sertraline 50 milliGRAM(s) Oral daily  tamsulosin 0.4 milliGRAM(s) Oral at bedtime    MEDICATIONS  (PRN):  acetaminophen   Tablet .. 650 milliGRAM(s) Oral every 4 hours PRN Temp greater or equal to 38C (100.4F), Mild Pain (1 - 3)  ondansetron Injectable 4 milliGRAM(s) IV Push every 6 hours PRN Nausea      Assessment and Plan:  88 y old male on A/C fell off his bed, c/O left arm pain, no LOC, found to have IVH. SAH. Possible left epicondylar fracture. COVID positive.    # acute subdural hematoma and intraventricular hemorrhage S/p fall --- stable   - no neuro deficits   - off coumadin    - neurosurgery no interventions   - PT eval/ OOB / requires bartolo lift and full assist OOB     #COVID positive --- acute illness resolved  - retest 12/29  - s/p dexamethasone     #UTI  - asymptomatic UTI no need for abx at this time.     #Afib  - Rate controlled for now  - no AC at this time given SAH and IVH     # r/o LUE fracture  - degenerative changes without acute fracture.     dispo: pending sub acute rehab placement , requires 2 negative swabs.   neg COVID 1/1  pos COVID 1/2           
CC: Fall    · Subjective and Objective:   89 yo male brought to ER by EMS after fall at home. Pt states he was walking with walker and "slid" down to his buttock. Pt denies hitting his head. 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 Pt states he had been sliding down frequently. in ED ABC intact HD stabe. No headache no neck pain. no SOB, no fever. No chest or abdominal pain. Mild  left elbow pain abrasion no focal neurological complaint, Moves all extremities  He was noted to have old ecchymosis to left side of face and scalp when asked about it pt states he does not recall hitting his head a head Ct was ordering by the ER physician after his INR was found to be at 6.20. Poor historian, very [place]. (01 Dec 2020 10:00)    12/2: Pt seen and examined, awake, alert, fever better, no SOB, O2 sat WNL. Neurologically stable. Mild left elbow pain, Almost full range of motion.  12/3 no acutre events, CT elbow negative for fracture, still pending MRI of brain for neurosurgical evaluation.  12/4: pt evaled sitting up in bed. no overnight events. MRI scheduled for today   12/7: pt seen and examined sitting up in bed. no overnight events. denies pain or discomfort   12/9: pt seen and examined sitting up in bed while eating. denies pain or discomfort. no n/v. pending placement   12/10: pt seen and examined sitting up in bed. no pain or discomfort. covid tested positive today otherwise status quo   12/11: pt seen and examined sitting up in bed. no pain or discomfort noted by pt. pending retest on 12/13 12/12/20: Patient seen and examined. He denies any complaints. Waiting for rehab bed once COVID-19 negative.  12/13/20: COVID-19 positive today. No new issues  12/14: covid + yesterday. otherwise no complaints.   12/15: pt denies pain or discomfort today. no new complaints. covid retesting today   12/16: pt resting in bed comfortably. no new complaints. covid tested + yesterday   12/17: pt sitting up in bed after breakfast. no new complaints. awaiting covid retesting.   12/18: pt seen and examined sitting up in bed. no new complaints.   12/19: pt sitting up in bed. no pain or discomfort noted. pending negative covid testing.   12/21: pt seen and examined sitting up in bed, resting. no discomfort or pain. pending negative covid swab.   12/22: pt seen and examined in bed. confused per baseline. no discomfort or pain.   12/23: pt lying in bed, confused and asking for his wife. denies discomfort or pain.   12/24: no complaints. awaiting negative covid testing. retest 12/25 12/25: sitting up in bed eating breakfast. tested negative today. retest tonight.   12/26/20: No new issues. COVID positive today  12/27/20: No new complaints.  12/28: sitting up, interactive. covid testing tonight   12/29: sitting up in bed. covid + this AM.   12/30/20: Looks better today. No new issues. Calm and relaxed.   12/31/20: No new complaints.   1/1: No new events. Status quo.    REVIEW OF SYSTEMS: All other review of systems is negative unless indicated above.    Vital Signs Last 24 Hrs  T(C): 36.6 (01 Jan 2021 08:40), Max: 36.6 (31 Dec 2020 16:23)  T(F): 97.8 (01 Jan 2021 08:40), Max: 97.9 (31 Dec 2020 20:50)  HR: 81 (01 Jan 2021 08:40) (75 - 81)  BP: 146/80 (01 Jan 2021 08:40) (115/75 - 146/80)  BP(mean): --  RR: 18 (01 Jan 2021 08:40) (18 - 19)  SpO2: 95% (01 Jan 2021 08:40) (94% - 96%)    PHYSICAL EXAM:    HEENT:  pupils equal and reactive, EOMI, no oropharyngeal lesions, erythema, exudates, oral thrush, scant left orbital hematoma   NECK:   supple, no carotid bruits, no palpable lymph nodes, no thyromegaly  CV:  +S1, +S2, regular, no murmurs or rubs  RESP:   lungs clear to auscultation bilaterally, no wheezing, rales, rhonchi, good air entry bilaterally  GI:  abdomen soft, non-tender, non-distended, normal BS, no bruits, no abdominal masses, no palpable masses  MSK:   normal muscle tone, no atrophy, no rigidity, no contractions  EXT:  no clubbing, no cyanosis, no edema, no calf pain, swelling or erythema. 5/5 strength   VASCULAR:  pulses equal and symmetric in the upper and lower extremities  NEURO:  AAOX2, no gross focal neurological deficits, follows commands, able to move extremities spontaneously  SKIN:  no ulcers, lesions. incontinence rash noted to perineum     MEDICATIONS  (STANDING):  atorvastatin 20 milliGRAM(s) Oral at bedtime  finasteride 5 milliGRAM(s) Oral daily  nystatin Powder 1 Application(s) Topical two times a day  pantoprazole    Tablet 40 milliGRAM(s) Oral daily  sertraline 50 milliGRAM(s) Oral daily  tamsulosin 0.4 milliGRAM(s) Oral at bedtime    MEDICATIONS  (PRN):  acetaminophen   Tablet .. 650 milliGRAM(s) Oral every 4 hours PRN Temp greater or equal to 38C (100.4F), Mild Pain (1 - 3)  ondansetron Injectable 4 milliGRAM(s) IV Push every 6 hours PRN Nausea      Assessment and Plan:  88 y old male on A/C fell off his bed, c/O left arm pain, no LOC, found to have IVH. SAH. Possible left epicondylar fracture. COVID positive.    # acute subdural hematoma and intraventricular hemorrhage S/p fall --- stable   - no neuro deficits   - off coumadin    - neurosurgery no interventions   - PT eval/ OOB / requires bartolo lift and full assist OOB     #COVID positive --- acute illness resolved  - retest 12/29  - s/p dexamethasone     #UTI  - asymptomatic UTI no need for abx at this time.     #Afib  - Rate controlled for now  - no AC at this time given SAH and IVH     # r/o LUE fracture  - degenerative changes without acute fracture.     dispo: pending sub acute rehab placement , requires 2 negative swabs.   neg COVID 1/1, repeat tonight       
CC: Fall    · Subjective and Objective:   89 yo male brought to ER by EMS after fall at home. Pt states he was walking with walker and "slid" down to his buttock. Pt denies hitting his head. 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 Pt states he had been sliding down frequently. in ED ABC intact HD stabe. No headache no neck pain. no SOB, no fever. No chest or abdominal pain. Mild  left elbow pain abrasion no focal neurological complaint, Moves all extremities  He was noted to have old ecchymosis to left side of face and scalp when asked about it pt states he does not recall hitting his head a head Ct was ordering by the ER physician after his INR was found to be at 6.20. Poor historian, very [place]. (01 Dec 2020 10:00)    12/2: Pt seen and examined, awake, alert, fever better, no SOB, O2 sat WNL. Neurologically stable. Mild left elbow pain, Almost full range of motion.  12/3 no acutre events, CT elbow negative for fracture, still pending MRI of brain for neurosurgical evaluation.  12/4: pt evaled sitting up in bed. no overnight events. MRI scheduled for today   12/7: pt seen and examined sitting up in bed. no overnight events. denies pain or discomfort   12/9: pt seen and examined sitting up in bed while eating. denies pain or discomfort. no n/v. pending placement   12/10: pt seen and examined sitting up in bed. no pain or discomfort. covid tested positive today otherwise status quo   12/11: pt seen and examined sitting up in bed. no pain or discomfort noted by pt. pending retest on 12/13 12/12/20: Patient seen and examined. He denies any complaints. Waiting for rehab bed once COVID-19 negative.  12/13/20: COVID-19 positive today. No new issues  12/14: covid + yesterday. otherwise no complaints.   12/15: pt denies pain or discomfort today. no new complaints. covid retesting today   12/16: pt resting in bed comfortably. no new complaints. covid tested + yesterday   12/17: pt sitting up in bed after breakfast. no new complaints. awaiting covid retesting.   12/18: pt seen and examined sitting up in bed. no new complaints.   12/19: pt sitting up in bed. no pain or discomfort noted. pending negative covid testing.   12/21: pt seen and examined sitting up in bed, resting. no discomfort or pain. pending negative covid swab.   12/22: pt seen and examined in bed. confused per baseline. no discomfort or pain.   12/23: pt lying in bed, confused and asking for his wife. denies discomfort or pain.   12/24: no complaints. awaiting negative covid testing. retest 12/25 12/25: sitting up in bed eating breakfast. tested negative today. retest tonight.   12/26/20: No new issues. COVID positive today  12/27/20: No new complaints.  12/28: sitting up, interactive. covid testing tonight   12/29: sitting up in bed. covid + this AM.   12/30/20: Looks better today. No new issues. Calm and relaxed.   12/31/20: No new complaints.   1/1: No new events. Status quo.  1/2: Lying in bed, comfortable. Eating food.  No new events.     REVIEW OF SYSTEMS: All other review of systems is negative unless indicated above.    Vital Signs Last 24 Hrs  T(C): 36.4 (02 Jan 2021 08:36), Max: 36.8 (01 Jan 2021 21:50)  T(F): 97.5 (02 Jan 2021 08:36), Max: 98.2 (01 Jan 2021 21:50)  HR: 77 (02 Jan 2021 08:36) (65 - 77)  BP: 115/81 (02 Jan 2021 08:36) (111/62 - 151/82)  BP(mean): --  RR: 17 (02 Jan 2021 08:36) (17 - 18)  SpO2: 95% (02 Jan 2021 08:36) (93% - 96%)    PHYSICAL EXAM:    HEENT:  pupils equal and reactive, EOMI, no oropharyngeal lesions, erythema, exudates, oral thrush, scant left orbital hematoma   NECK:   supple, no carotid bruits, no palpable lymph nodes, no thyromegaly  CV:  +S1, +S2, regular, no murmurs or rubs  RESP:   lungs clear to auscultation bilaterally, no wheezing, rales, rhonchi, good air entry bilaterally  GI:  abdomen soft, non-tender, non-distended, normal BS, no bruits, no abdominal masses, no palpable masses  MSK:   normal muscle tone, no atrophy, no rigidity, no contractions  EXT:  no clubbing, no cyanosis, no edema, no calf pain, swelling or erythema. 5/5 strength   VASCULAR:  pulses equal and symmetric in the upper and lower extremities  NEURO:  AAOX2, no gross focal neurological deficits, follows commands, able to move extremities spontaneously  SKIN:  no ulcers, lesions. incontinence rash noted to perineum     MEDICATIONS  (STANDING):  atorvastatin 20 milliGRAM(s) Oral at bedtime  finasteride 5 milliGRAM(s) Oral daily  nystatin Powder 1 Application(s) Topical two times a day  pantoprazole    Tablet 40 milliGRAM(s) Oral daily  sertraline 50 milliGRAM(s) Oral daily  tamsulosin 0.4 milliGRAM(s) Oral at bedtime    MEDICATIONS  (PRN):  acetaminophen   Tablet .. 650 milliGRAM(s) Oral every 4 hours PRN Temp greater or equal to 38C (100.4F), Mild Pain (1 - 3)  ondansetron Injectable 4 milliGRAM(s) IV Push every 6 hours PRN Nausea      Assessment and Plan:  88 y old male on A/C fell off his bed, c/O left arm pain, no LOC, found to have IVH. SAH. Possible left epicondylar fracture. COVID positive.    # acute subdural hematoma and intraventricular hemorrhage S/p fall --- stable   - no neuro deficits   - off coumadin    - neurosurgery no interventions   - PT eval/ OOB / requires bartolo lift and full assist OOB     #COVID positive --- acute illness resolved  - retest 12/29  - s/p dexamethasone     #UTI  - asymptomatic UTI no need for abx at this time.     #Afib  - Rate controlled for now  - no AC at this time given SAH and IVH     # r/o LUE fracture  - degenerative changes without acute fracture.     dispo: pending sub acute rehab placement , requires 2 negative swabs.   neg COVID 1/1  pos COVID 1/2         
CC:Patient is a 88y old  Male who presents with a chief complaint of fall, head injury (27 Dec 2020 11:42)      Subjective:  Pt seen and examined at bedside with chaperone. Pt is AAOx3, pt in no acute distress. Pt denied c/o fever, chills, chest pain, SOB, abd pain, N/V/D, extremity pain or dysfunction, hemoptysis, hematemesis, hematuria, hematochexia, headache, diplopia, vertigo, dizzyness. Pt tolerating diet, (+) void, (+) oob, (+) bowel function    ROS:  otherwise as abovementioned ROS    Vital Signs Last 24 Hrs  T(C): 36.6 (27 Dec 2020 09:00), Max: 36.9 (26 Dec 2020 20:05)  T(F): 97.9 (27 Dec 2020 09:00), Max: 98.4 (26 Dec 2020 20:05)  HR: 80 (27 Dec 2020 09:50) (75 - 121)  BP: 139/70 (27 Dec 2020 09:00) (136/71 - 140/72)  BP(mean): --  RR: 18 (27 Dec 2020 09:00) (16 - 18)  SpO2: 95% (27 Dec 2020 09:00) (94% - 96%)    Labs:                            Meds:  acetaminophen   Tablet .. 650 milliGRAM(s) Oral every 4 hours PRN  atorvastatin 20 milliGRAM(s) Oral at bedtime  finasteride 5 milliGRAM(s) Oral daily  nystatin Powder 1 Application(s) Topical two times a day  ondansetron Injectable 4 milliGRAM(s) IV Push every 6 hours PRN  pantoprazole    Tablet 40 milliGRAM(s) Oral daily  sertraline 50 milliGRAM(s) Oral daily  tamsulosin 0.4 milliGRAM(s) Oral at bedtime      Radiology:      Physical exam:  GCS of 15  Pt in no acute distress  Airway is patent  Breathing is symmetric and unlabored  Neuro: CN II-XII grossly intact  Psych: normal affect  HEENT: normocephalic, ALPHONSO, EOM wnl, + left periorbital ecchymosis, no gross craniofacial bony pathology to exam otherwise  Neck: No tracheal deviation, no JVD, no crepitus, no ecchymosis, no hematoma  Chest: No gross rib or sternal pathology or tenderness to exam, no crepitus, no ecchymosis, no hematoma  Resp: CTAB  CVS: S1S2(+)  ABD: bowel sounds (+), soft, nontender, non distended, no rebound, no guarding, no rigidity, no pelvic instability to exam  EXT: no calf tenderness or edema to exam b/l, pt has good capillary refill in all digits. Sensoromotor function grossly intact, on VTE prophylaxis  Skin: no adverse skin changes to exam      
CC:Patient is a 88y old  Male who presents with a chief complaint of Fall (04 Jan 2021 14:33)      Subjective:  Pt seen and examined at bedside. Pt is AAOx3, pt in no acute distress. Pt denied c/o fever, chills, chest pain, SOB, abd pain, N/V/D, extremity pain or dysfunction, hemoptysis, hematemesis, hematuria, hematochexia, headache, diplopia, vertigo, dizzyness. Pt tolerating diet, (+) void, (+) oob, (+) bowel function    ROS:  otherwise as abovementioned ROS    Vital Signs Last 24 Hrs  T(C): 36.7 (04 Jan 2021 21:04), Max: 36.7 (04 Jan 2021 21:04)  T(F): 98 (04 Jan 2021 21:04), Max: 98 (04 Jan 2021 21:04)  HR: 68 (04 Jan 2021 21:04) (68 - 86)  BP: 138/81 (04 Jan 2021 21:04) (138/81 - 139/84)  BP(mean): --  RR: 18 (04 Jan 2021 21:04) (18 - 18)  SpO2: 98% (04 Jan 2021 21:04) (94% - 98%)    Labs:                            Meds:  acetaminophen   Tablet .. 650 milliGRAM(s) Oral every 4 hours PRN  atorvastatin 20 milliGRAM(s) Oral at bedtime  finasteride 5 milliGRAM(s) Oral daily  nystatin Powder 1 Application(s) Topical two times a day  ondansetron Injectable 4 milliGRAM(s) IV Push every 6 hours PRN  pantoprazole    Tablet 40 milliGRAM(s) Oral daily  sertraline 50 milliGRAM(s) Oral daily  tamsulosin 0.4 milliGRAM(s) Oral at bedtime      Radiology:      Physical exam:  GCS of 15  Pt in no acute distress  Airway is patent  Breathing is symmetric and unlabored  Neuro: CN II-XII grossly intact  Psych: normal affect  HEENT: normocephalic, ALPHONSO, EOM wnl, + left periorbital ecchymosis, no gross craniofacial bony pathology to exam otherwise  Neck: No tracheal deviation, no JVD, no crepitus, no ecchymosis, no hematoma  Chest: No gross rib or sternal pathology or tenderness to exam, no crepitus, no ecchymosis, no hematoma  Resp: CTAB  CVS: S1S2(+)  ABD: bowel sounds (+), soft, nontender, non distended, no rebound, no guarding, no rigidity, no pelvic instability to exam  EXT: no calf tenderness or edema to exam b/l, pt has good capillary refill in all digits. Sensoromotor function grossly intact, on VTE prophylaxis  Skin: no adverse skin changes to exam      
CC:Patient is a 88y old  Male who presents with a chief complaint of fall head injury (30 Dec 2020 14:47)      Subjective:  Pt seen and examined at bedside with chaperone. Pt is AAOx3, pt in no acute distress. Pt denied c/o fever, chills, chest pain, SOB, abd pain, N/V/D, extremity pain or dysfunction, hemoptysis, hematemesis, hematuria, hematochexia, headache, diplopia, vertigo, dizzyness. Pt tolerating diet, (+) void, (+) oob, (+) bowel function    ROS:  otherwise as abovementioned ROS    Vital Signs Last 24 Hrs  T(C): 36.4 (31 Dec 2020 08:46), Max: 36.6 (30 Dec 2020 16:21)  T(F): 97.5 (31 Dec 2020 08:46), Max: 97.8 (30 Dec 2020 16:21)  HR: 71 (31 Dec 2020 08:46) (56 - 74)  BP: 125/74 (31 Dec 2020 08:46) (102/65 - 125/74)  BP(mean): --  RR: 18 (31 Dec 2020 08:46) (17 - 18)  SpO2: 93% (31 Dec 2020 08:46) (93% - 96%)    Labs:                            Meds:  acetaminophen   Tablet .. 650 milliGRAM(s) Oral every 4 hours PRN  atorvastatin 20 milliGRAM(s) Oral at bedtime  finasteride 5 milliGRAM(s) Oral daily  nystatin Powder 1 Application(s) Topical two times a day  ondansetron Injectable 4 milliGRAM(s) IV Push every 6 hours PRN  pantoprazole    Tablet 40 milliGRAM(s) Oral daily  sertraline 50 milliGRAM(s) Oral daily  tamsulosin 0.4 milliGRAM(s) Oral at bedtime      Radiology:      Physical exam:  GCS of 15  Pt in no acute distress  Airway is patent  Breathing is symmetric and unlabored  Neuro: CN II-XII grossly intact  Psych: normal affect  HEENT: normocephalic, ALPHONSO, EOM wnl, + left periorbital ecchymosis, no gross craniofacial bony pathology to exam otherwise  Neck: No tracheal deviation, no JVD, no crepitus, no ecchymosis, no hematoma  Chest: No gross rib or sternal pathology or tenderness to exam, no crepitus, no ecchymosis, no hematoma  Resp: CTAB  CVS: S1S2(+)  ABD: bowel sounds (+), soft, nontender, non distended, no rebound, no guarding, no rigidity, no pelvic instability to exam  EXT: no calf tenderness or edema to exam b/l, pt has good capillary refill in all digits. Sensoromotor function grossly intact, on VTE prophylaxis  Skin: no adverse skin changes to exam      
CC:Patient is a 88y old  Male who presents with a chief complaint of fall intraventricular hemorrhage and covid +  (07 Jan 2021 11:07)      Subjective:  Pt seen and examined at bedside. Pt is AAOx3, pt in no acute distress. Pt denied c/o fever, chills, chest pain, SOB, abd pain, N/V/D, extremity pain or dysfunction, hemoptysis, hematemesis, hematuria, hematochexia, headache, diplopia, vertigo, dizzyness. Pt tolerating diet, (+) void, (+) oob, (+) bowel function    ROS:  otherwise as abovementioned ROS    Vital Signs Last 24 Hrs  T(C): 36.6 (06 Jan 2021 16:52), Max: 36.6 (06 Jan 2021 16:52)  T(F): 97.9 (06 Jan 2021 16:52), Max: 97.9 (06 Jan 2021 16:52)  HR: 59 (07 Jan 2021 09:15) (59 - 60)  BP: 134/72 (07 Jan 2021 09:15) (113/73 - 134/72)  BP(mean): --  RR: 18 (06 Jan 2021 16:52) (18 - 18)  SpO2: 94% (07 Jan 2021 09:15) (94% - 96%)    Labs:                            Meds:  acetaminophen   Tablet .. 650 milliGRAM(s) Oral every 4 hours PRN  atorvastatin 20 milliGRAM(s) Oral at bedtime  finasteride 5 milliGRAM(s) Oral daily  nystatin Powder 1 Application(s) Topical two times a day  ondansetron Injectable 4 milliGRAM(s) IV Push every 6 hours PRN  pantoprazole    Tablet 40 milliGRAM(s) Oral daily  sertraline 50 milliGRAM(s) Oral daily  tamsulosin 0.4 milliGRAM(s) Oral at bedtime      Radiology:      Physical exam:  GCS of 15  Pt in no acute distress  Airway is patent  Breathing is symmetric and unlabored  Neuro: CN II-XII grossly intact  Psych: normal affect  HEENT: normocephalic, ALPHONSO, EOM wnl, + left periorbital ecchymosis, no gross craniofacial bony pathology to exam otherwise  Neck: No tracheal deviation, no JVD, no crepitus, no ecchymosis, no hematoma  Chest: No gross rib or sternal pathology or tenderness to exam, no crepitus, no ecchymosis, no hematoma  Resp: CTAB  CVS: S1S2(+)  ABD: bowel sounds (+), soft, nontender, non distended, no rebound, no guarding, no rigidity, no pelvic instability to exam  EXT: no calf tenderness or edema to exam b/l, pt has good capillary refill in all digits. Sensoromotor function grossly intact, on VTE prophylaxis  Skin: no adverse skin changes to exam        
87 yo male brought to ER by EMS after fall at home. Pt states he was walking with walker and "slid" down to his buttock. Pt denies hitting his head. 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 Pt states he had been sliding down frequently. in ED ABC intact HD stabe. No headache no neck pain. no SOB, no fever. No chest or abdominal pain. Mild  left elbow pain abrasion no focal neurological complaint, Moves all extremities  He was noted to have old ecchymosis to left side of face and scalp when asked about it pt states he does not recall hitting his head a head Ct was ordering by the ER physician after his INR was found to be at 6.20. Poor historian, very [place]. (01 Dec 2020 10:00)    12/2: Pt seen and examined, awake, alert, fever better, no SOB, O2 sat WNL. Neurologically stable. Mild left elbow pain, Almost full range of motion.  12/3 no acutre events, CT elbow negative for fracture, still pending MRI of brain for neurosurgical evaluation.  12/4: pt evaled sitting up in bed. no overnight events. MRI scheduled for today   12/7: pt seen and examined sitting up in bed. no overnight events. denies pain or discomfort   12/9: pt seen and examined sitting up in bed while eating. denies pain or discomfort. no n/v. pending placement   12/10: pt seen and examined sitting up in bed. no pain or discomfort. covid tested positive today otherwise status quo   12/11: pt seen and examined sitting up in bed. no pain or discomfort noted by pt. pending retest on 12/13 12/12/20: Patient seen and examined. He denies any complaints. Waiting for rehab bed once COVID-19 negative.  12/13/20: COVID-19 positive today. No new issues  12/14: covid + yesterday. otherwise no complaints.   12/15: pt denies pain or discomfort today. no new complaints. covid retesting today   12/16: pt resting in bed comfortably. no new complaints. covid tested + yesterday   12/17: pt sitting up in bed after breakfast. no new complaints. awaiting covid retesting.   12/18: pt seen and examined sitting up in bed. no new complaints.   12/19: pt sitting up in bed. no pain or discomfort noted. pending negative covid testing.   12/21: pt seen and examined sitting up in bed, resting. no discomfort or pain. pending negative covid swab.   12/22: pt seen and examined in bed. confused per baseline. no discomfort or pain.   12/23: pt lying in bed, confused and asking for his wife. denies discomfort or pain.   12/24: no complaints. awaiting negative covid testing. retest 12/25 12/25: sitting up in bed eating breakfast. tested negative today. retest tonight.   12/26/20: No new issues. COVID positive today    Vital Signs Last 24 Hrs  T(C): 36.1 (25 Dec 2020 08:17), Max: 37 (24 Dec 2020 17:15)  T(F): 97 (25 Dec 2020 08:17), Max: 98.6 (24 Dec 2020 17:15)  HR: 75 (25 Dec 2020 08:17) (75 - 77)  BP: 153/81 (25 Dec 2020 08:17) (134/69 - 153/81)  BP(mean): --  RR: 17 (25 Dec 2020 08:17) (17 - 17)  SpO2: 96% (25 Dec 2020 08:17) (96% - 100%) --- room air     PHYSICAL EXAM:    HEENT:  pupils equal and reactive, EOMI, no oropharyngeal lesions, erythema, exudates, oral thrush, scant left orbital hematoma   NECK:   supple, no carotid bruits, no palpable lymph nodes, no thyromegaly  CV:  +S1, +S2, regular, no murmurs or rubs  RESP:   lungs clear to auscultation bilaterally, no wheezing, rales, rhonchi, good air entry bilaterally  GI:  abdomen soft, non-tender, non-distended, normal BS, no bruits, no abdominal masses, no palpable masses  MSK:   normal muscle tone, no atrophy, no rigidity, no contractions  EXT:  no clubbing, no cyanosis, no edema, no calf pain, swelling or erythema. 5/5 strength   VASCULAR:  pulses equal and symmetric in the upper and lower extremities  NEURO:  AAOX2, no gross focal neurological deficits, follows commands, able to move extremities spontaneously  SKIN:  no ulcers, lesions. incontinence rash noted to perineum       MEDICATIONS  (STANDING):  atorvastatin 20 milliGRAM(s) Oral at bedtime  finasteride 5 milliGRAM(s) Oral daily  pantoprazole    Tablet 40 milliGRAM(s) Oral daily  sertraline 50 milliGRAM(s) Oral daily  tamsulosin 0.4 milliGRAM(s) Oral at bedtime    MEDICATIONS  (PRN):  acetaminophen   Tablet .. 650 milliGRAM(s) Oral every 4 hours PRN Temp greater or equal to 38C (100.4F), Mild Pain (1 - 3)  ondansetron Injectable 4 milliGRAM(s) IV Push every 6 hours PRN Nausea    
87 yo male brought to ER by EMS after fall at home. Pt states he was walking with walker and "slid" down to his buttock. Pt denies hitting his head. 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 Pt states he had been sliding down frequently. in ED ABC intact HD stabe. No headache no neck pain. no SOB, no fever. No chest or abdominal pain. Mild  left elbow pain abrasion no focal neurological complaint, Moves all extremities  He was noted to have old ecchymosis to left side of face and scalp when asked about it pt states he does not recall hitting his head a head Ct was ordering by the ER physician after his INR was found to be at 6.20. Poor historian, very [place]. (01 Dec 2020 10:00)    12/2: Pt seen and examined, awake, alert, fever better, no SOB, O2 sat WNL. Neurologically stable. Mild left elbow pain, Almost full range of motion.  12/3 no acutre events, CT elbow negative for fracture, still pending MRI of brain for neurosurgical evaluation.  12/4: pt evaled sitting up in bed. no overnight events. MRI scheduled for today   12/7: pt seen and examined sitting up in bed. no overnight events. denies pain or discomfort   12/9: pt seen and examined sitting up in bed while eating. denies pain or discomfort. no n/v. pending placement   12/10: pt seen and examined sitting up in bed. no pain or discomfort. covid tested positive today otherwise status quo   12/11: pt seen and examined sitting up in bed. no pain or discomfort noted by pt. pending retest on 12/13 12/12/20: Patient seen and examined. He denies any complaints. Waiting for rehab bed once COVID-19 negative.  12/13/20: COVID-19 positive today. No new issues  12/14: covid + yesterday. otherwise no complaints.   12/15: pt denies pain or discomfort today. no new complaints. covid retesting today   12/16: pt resting in bed comfortably. no new complaints. covid tested + yesterday   12/17: pt sitting up in bed after breakfast. no new complaints. awaiting covid retesting.   12/18: pt seen and examined sitting up in bed. no new complaints.   12/19: pt sitting up in bed. no pain or discomfort noted. pending negative covid testing.   12/21: pt seen and examined sitting up in bed, resting. no discomfort or pain. pending negative covid swab.   12/22: pt seen and examined in bed. confused per baseline. no discomfort or pain.   12/23: pt lying in bed, confused and asking for his wife. denies discomfort or pain.   12/24: no complaints. awaiting negative covid testing. retest 12/25 12/25: sitting up in bed eating breakfast. tested negative today. retest tonight.     Vital Signs Last 24 Hrs  T(C): 36.1 (25 Dec 2020 08:17), Max: 37 (24 Dec 2020 17:15)  T(F): 97 (25 Dec 2020 08:17), Max: 98.6 (24 Dec 2020 17:15)  HR: 75 (25 Dec 2020 08:17) (75 - 77)  BP: 153/81 (25 Dec 2020 08:17) (134/69 - 153/81)  BP(mean): --  RR: 17 (25 Dec 2020 08:17) (17 - 17)  SpO2: 96% (25 Dec 2020 08:17) (96% - 100%) --- room air     PHYSICAL EXAM:    HEENT:  pupils equal and reactive, EOMI, no oropharyngeal lesions, erythema, exudates, oral thrush, scant left orbital hematoma   NECK:   supple, no carotid bruits, no palpable lymph nodes, no thyromegaly  CV:  +S1, +S2, regular, no murmurs or rubs  RESP:   lungs clear to auscultation bilaterally, no wheezing, rales, rhonchi, good air entry bilaterally  GI:  abdomen soft, non-tender, non-distended, normal BS, no bruits, no abdominal masses, no palpable masses  MSK:   normal muscle tone, no atrophy, no rigidity, no contractions  EXT:  no clubbing, no cyanosis, no edema, no calf pain, swelling or erythema. 5/5 strength   VASCULAR:  pulses equal and symmetric in the upper and lower extremities  NEURO:  AAOX2, no gross focal neurological deficits, follows commands, able to move extremities spontaneously  SKIN:  no ulcers, lesions. incontinence rash noted to perineum       MEDICATIONS  (STANDING):  atorvastatin 20 milliGRAM(s) Oral at bedtime  finasteride 5 milliGRAM(s) Oral daily  pantoprazole    Tablet 40 milliGRAM(s) Oral daily  sertraline 50 milliGRAM(s) Oral daily  tamsulosin 0.4 milliGRAM(s) Oral at bedtime    MEDICATIONS  (PRN):  acetaminophen   Tablet .. 650 milliGRAM(s) Oral every 4 hours PRN Temp greater or equal to 38C (100.4F), Mild Pain (1 - 3)  ondansetron Injectable 4 milliGRAM(s) IV Push every 6 hours PRN Nausea    
CC:Patient is a 88y old  Male who presents with a chief complaint of fall, head injury (25 Dec 2020 10:20)      Subjective:  Pt seen and examined at bedside with chaperone. Pt is awake, alert, comfortable, cooperative, pt in no acute distress. Pt denied c/o fever, chills, chest pain, SOB, abd pain, N/V/D, extremity pain or dysfunction, hemoptysis, hematemesis, hematuria, hematochexia, headache, diplopia, vertigo, dizzyness. Pt tolerating diet, (+) void, (+) oob, (+) bowel function    ROS:  otherwise as abovementioned ROS    Vital Signs Last 24 Hrs  T(C): 36.5 (26 Dec 2020 08:49), Max: 37 (25 Dec 2020 16:47)  T(F): 97.7 (26 Dec 2020 08:49), Max: 98.6 (25 Dec 2020 16:47)  HR: 62 (26 Dec 2020 08:49) (62 - 87)  BP: 136/64 (26 Dec 2020 08:49) (135/69 - 136/75)  BP(mean): --  RR: 18 (26 Dec 2020 08:49) (18 - 18)  SpO2: 100% (26 Dec 2020 08:49) (94% - 100%)    Labs:                            Meds:  acetaminophen   Tablet .. 650 milliGRAM(s) Oral every 4 hours PRN  atorvastatin 20 milliGRAM(s) Oral at bedtime  finasteride 5 milliGRAM(s) Oral daily  nystatin Powder 1 Application(s) Topical two times a day  ondansetron Injectable 4 milliGRAM(s) IV Push every 6 hours PRN  pantoprazole    Tablet 40 milliGRAM(s) Oral daily  sertraline 50 milliGRAM(s) Oral daily  tamsulosin 0.4 milliGRAM(s) Oral at bedtime      Radiology:      Physical exam:  GCS of 15  Pt in no acute distress  Airway is patent  Breathing is symmetric and unlabored  Neuro: CN II-XII grossly intact  Psych: normal affect  HEENT: normocephalic, ALPHONSO, EOM wnl, + left periorbital ecchymosis, no gross craniofacial bony pathology to exam otherwise  Neck: No tracheal deviation, no JVD, no crepitus, no ecchymosis, no hematoma  Chest: No gross rib or sternal pathology or tenderness to exam, no crepitus, no ecchymosis, no hematoma  Resp: CTAB  CVS: S1S2(+)  ABD: bowel sounds (+), soft, nontender, non distended, no rebound, no guarding, no rigidity, no pelvic instability to exam  EXT: no calf tenderness or edema to exam b/l, pt has good capillary refill in all digits. Sensoromotor function grossly intact, on VTE prophylaxis  Skin: no adverse skin changes to exam    
87 yo male brought to ER by EMS after fall at home. Pt states he was walking with walker and "slid" down to his buttock. Pt denies hitting his head. 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 Pt states he had been sliding down frequently. in ED ABC intact HD stabe. No headache no neck pain. no SOB, no fever. No chest or abdominal pain. Mild  left elbow pain abrasion no focal neurological complaint, Moves all extremities  He was noted to have old ecchymosis to left side of face and scalp when asked about it pt states he does not recall hitting his head a head Ct was ordering by the ER physician after his INR was found to be at 6.20. Poor historian, very [place]. (01 Dec 2020 10:00)    12/2: Pt seen and examined, awake, alert, fever better, no SOB, O2 sat WNL. Neurologically stable. Mild left elbow pain, Almost full range of motion.  12/3 no acutre events, CT elbow negative for fracture, still pending MRI of brain for neurosurgical evaluation.  12/4: pt evaled sitting up in bed. no overnight events. MRI scheduled for today   12/7: pt seen and examined sitting up in bed. no overnight events. denies pain or discomfort   12/9: pt seen and examined sitting up in bed while eating. denies pain or discomfort. no n/v. pending placement   12/10: pt seen and examined sitting up in bed. no pain or discomfort. covid tested positive today otherwise status quo   12/11: pt seen and examined sitting up in bed. no pain or discomfort noted by pt. pending retest on 12/13 12/12/20: Patient seen and examined. He denies any complaints. Waiting for rehab bed once COVID-19 negative.  12/13/20: COVID-19 positive today. No new issues  12/14: covid + yesterday. otherwise no complaints.   12/15: pt denies pain or discomfort today. no new complaints. covid retesting today   12/16: pt resting in bed comfortably. no new complaints. covid tested + yesterday   12/17: pt sitting up in bed after breakfast. no new complaints. awaiting covid retesting.   12/18: pt seen and examined sitting up in bed. no new complaints.     Vital Signs Last 24 Hrs  T(C): 36.4 (18 Dec 2020 08:58), Max: 36.4 (17 Dec 2020 16:04)  T(F): 97.5 (18 Dec 2020 08:58), Max: 97.6 (17 Dec 2020 16:04)  HR: 63 (18 Dec 2020 08:58) (63 - 72)  BP: 152/89 (18 Dec 2020 08:58) (134/73 - 152/89)  BP(mean): --  RR: 18 (18 Dec 2020 08:58) (18 - 18)  SpO2: 95% (18 Dec 2020 08:58) (95% - 97%)--- room air       PHYSICAL EXAM:    HEENT:  pupils equal and reactive, EOMI, no oropharyngeal lesions, erythema, exudates, oral thrush, left orbital hematoma   NECK:   supple, no carotid bruits, no palpable lymph nodes, no thyromegaly  CV:  +S1, +S2, regular, no murmurs or rubs  RESP:   lungs clear to auscultation bilaterally, no wheezing, rales, rhonchi, good air entry bilaterally  GI:  abdomen soft, non-tender, non-distended, normal BS, no bruits, no abdominal masses, no palpable masses  MSK:   normal muscle tone, no atrophy, no rigidity, no contractions  EXT:  no clubbing, no cyanosis, no edema, no calf pain, swelling or erythema. 5/5 strength   VASCULAR:  pulses equal and symmetric in the upper and lower extremities  NEURO:  AAOX2, no gross focal neurological deficits, follows commands, able to move extremities spontaneously  SKIN:  no ulcers, lesions or rashes        MEDICATIONS  (STANDING):  atorvastatin 20 milliGRAM(s) Oral at bedtime  finasteride 5 milliGRAM(s) Oral daily  pantoprazole    Tablet 40 milliGRAM(s) Oral daily  sertraline 50 milliGRAM(s) Oral daily  tamsulosin 0.4 milliGRAM(s) Oral at bedtime    MEDICATIONS  (PRN):  acetaminophen   Tablet .. 650 milliGRAM(s) Oral every 4 hours PRN Temp greater or equal to 38C (100.4F), Mild Pain (1 - 3)  ondansetron Injectable 4 milliGRAM(s) IV Push every 6 hours PRN Nausea    
89 yo male brought to ER by EMS after fall at home. Pt states he was walking with walker and "slid" down to his buttock. Pt denies hitting his head. 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 Pt states he had been sliding down frequently. in ED ABC intact HD stabe. No headache no neck pain. no SOB, no fever. No chest or abdominal pain. Mild  left elbow pain abrasion no focal neurological complaint, Moves all extremities  He was noted to have old ecchymosis to left side of face and scalp when asked about it pt states he does not recall hitting his head a head Ct was ordering by the ER physician after his INR was found to be at 6.20. Poor historian, very [place]. (01 Dec 2020 10:00)    12/2: Pt seen and examined, awake, alert, fever better, no SOB, O2 sat WNL. Neurologically stable. Mild left elbow pain, Almost full range of motion.  12/3 no acutre events, CT elbow negative for fracture, still pending MRI of brain for neurosurgical evaluation.  12/4: pt evaled sitting up in bed. no overnight events. MRI scheduled for today   12/7: pt seen and examined sitting up in bed. no overnight events. denies pain or discomfort   12/9: pt seen and examined sitting up in bed while eating. denies pain or discomfort. no n/v. pending placement   12/10: pt seen and examined sitting up in bed. no pain or discomfort. covid tested positive today otherwise status quo   12/11: pt seen and examined sitting up in bed. no pain or discomfort noted by pt. pending retest on 12/13 12/12/20: Patient seen and examined. He denies any complaints. Waiting for rehab bed once COVID-19 negative.  12/13/20: COVID-19 positive today. No new issues  12/14: covid + yesterday. otherwise no complaints.   12/15: pt denies pain or discomfort today. no new complaints. covid retesting today   12/16: pt resting in bed comfortably. no new complaints. covid tested + yesterday   12/17: pt sitting up in bed after breakfast. no new complaints. awaiting covid retesting.   12/18: pt seen and examined sitting up in bed. no new complaints.   12/19: pt sitting up in bed. no pain or discomfort noted. pending negative covid testing.     Vital Signs Last 24 Hrs  T(C): 36.7 (19 Dec 2020 09:35), Max: 36.7 (18 Dec 2020 16:01)  T(F): 98 (19 Dec 2020 09:35), Max: 98.1 (18 Dec 2020 16:01)  HR: 82 (19 Dec 2020 09:35) (67 - 82)  BP: 124/54 (19 Dec 2020 09:35) (124/54 - 144/80)  BP(mean): 76 (18 Dec 2020 16:01) (76 - 76)  RR: 19 (19 Dec 2020 09:35) (18 - 19)  SpO2: 98% (19 Dec 2020 09:35) (98% - 99%) --- room air       PHYSICAL EXAM:    HEENT:  pupils equal and reactive, EOMI, no oropharyngeal lesions, erythema, exudates, oral thrush, left orbital hematoma improving  NECK:   supple, no carotid bruits, no palpable lymph nodes, no thyromegaly  CV:  +S1, +S2, regular, no murmurs or rubs  RESP:   lungs clear to auscultation bilaterally, no wheezing, rales, rhonchi, good air entry bilaterally  GI:  abdomen soft, non-tender, non-distended, normal BS, no bruits, no abdominal masses, no palpable masses  MSK:   normal muscle tone, no atrophy, no rigidity, no contractions  EXT:  no clubbing, no cyanosis, no edema, no calf pain, swelling or erythema. 5/5 strength   VASCULAR:  pulses equal and symmetric in the upper and lower extremities  NEURO:  AAOX2, no gross focal neurological deficits, follows commands, able to move extremities spontaneously  SKIN:  no ulcers, lesions or rashes        MEDICATIONS  (STANDING):  atorvastatin 20 milliGRAM(s) Oral at bedtime  finasteride 5 milliGRAM(s) Oral daily  pantoprazole    Tablet 40 milliGRAM(s) Oral daily  sertraline 50 milliGRAM(s) Oral daily  tamsulosin 0.4 milliGRAM(s) Oral at bedtime    MEDICATIONS  (PRN):  acetaminophen   Tablet .. 650 milliGRAM(s) Oral every 4 hours PRN Temp greater or equal to 38C (100.4F), Mild Pain (1 - 3)  ondansetron Injectable 4 milliGRAM(s) IV Push every 6 hours PRN Nausea    
87 yo male brought to ER by EMS after fall at home. Pt states he was walking with walker and "slid" down to his buttock. Pt denies hitting his head. 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 Pt states he had been sliding down frequently. in ED ABC intact HD stabe. No headache no neck pain. no SOB, no fever. No chest or abdominal pain. Mild  left elbow pain abrasion no focal neurological complaint, Moves all extremities  He was noted to have old ecchymosis to left side of face and scalp when asked about it pt states he does not recall hitting his head a head Ct was ordering by the ER physician after his INR was found to be at 6.20. Poor historian, very [place]. (01 Dec 2020 10:00)    12/2: Pt seen and examined, awake, alert, fever better, no SOB, O2 sat WNL. Neurologically stable. Mild left elbow pain, Almost full range of motion.  12/3 no acutre events, CT elbow negative for fracture, still pending MRI of brain for neurosurgical evaluation.  12/4: pt evaled sitting up in bed. no overnight events. MRI scheduled for today   12/7: pt seen and examined sitting up in bed. no overnight events. denies pain or discomfort   12/9: pt seen and examined sitting up in bed while eating. denies pain or discomfort. no n/v. pending placement   12/10: pt seen and examined sitting up in bed. no pain or discomfort. covid tested positive today otherwise status quo   12/11: pt seen and examined sitting up in bed. no pain or discomfort noted by pt. pending retest on 12/13 12/12/20: Patient seen and examined. He denies any complaints. Waiting for rehab bed once COVID-19 negative.  12/13/20: COVID-19 positive today. No new issues  12/14: covid + yesterday. otherwise no complaints.   12/15: pt denies pain or discomfort today. no new complaints. covid retesting today   12/16: pt resting in bed comfortably. no new complaints. covid tested + yesterday   12/17: pt sitting up in bed after breakfast. no new complaints. awaiting covid retesting.   12/18: pt seen and examined sitting up in bed. no new complaints.   12/19: pt sitting up in bed. no pain or discomfort noted. pending negative covid testing.   12/21: pt seen and examined sitting up in bed, resting. no discomfort or pain. pending negative covid swab.   12/22: pt seen and examined in bed. confused per baseline. no discomfort or pain.   12/23: pt lying in bed, confused and asking for his wife. denies discomfort or pain.   12/24: no complaints. awaiting negative covid testing. retest 12/25 12/25: sitting up in bed eating breakfast. tested negative today. retest tonight.   12/26/20: No new issues. COVID positive today  12/27/20: No new complaints.  12/28: sitting up, interactive. covid testing tonight   12/29: sitting up in bed. covid + this AM.   12/30/20: Looks better today. No new issues. Calm and relaxed.   12/31/20: No new complaints.       Vital Signs Last 24 Hrs  T(C): 36.4 (31 Dec 2020 08:46), Max: 36.6 (30 Dec 2020 16:21)  T(F): 97.5 (31 Dec 2020 08:46), Max: 97.8 (30 Dec 2020 16:21)  HR: 71 (31 Dec 2020 08:46) (56 - 74)  BP: 125/74 (31 Dec 2020 08:46) (102/65 - 125/74)  BP(mean): --  RR: 18 (31 Dec 2020 08:46) (17 - 18)  SpO2: 93% (31 Dec 2020 08:46) (93% - 96%)       PHYSICAL EXAM:    HEENT:  pupils equal and reactive, EOMI, no oropharyngeal lesions, erythema, exudates, oral thrush, scant left orbital hematoma   NECK:   supple, no carotid bruits, no palpable lymph nodes, no thyromegaly  CV:  +S1, +S2, regular, no murmurs or rubs  RESP:   lungs clear to auscultation bilaterally, no wheezing, rales, rhonchi, good air entry bilaterally  GI:  abdomen soft, non-tender, non-distended, normal BS, no bruits, no abdominal masses, no palpable masses  MSK:   normal muscle tone, no atrophy, no rigidity, no contractions  EXT:  no clubbing, no cyanosis, no edema, no calf pain, swelling or erythema. 5/5 strength   VASCULAR:  pulses equal and symmetric in the upper and lower extremities  NEURO:  AAOX2, no gross focal neurological deficits, follows commands, able to move extremities spontaneously  SKIN:  no ulcers, lesions. incontinence rash noted to perineum       MEDICATIONS  (STANDING):  atorvastatin 20 milliGRAM(s) Oral at bedtime  finasteride 5 milliGRAM(s) Oral daily  pantoprazole    Tablet 40 milliGRAM(s) Oral daily  sertraline 50 milliGRAM(s) Oral daily  tamsulosin 0.4 milliGRAM(s) Oral at bedtime    MEDICATIONS  (PRN):  acetaminophen   Tablet .. 650 milliGRAM(s) Oral every 4 hours PRN Temp greater or equal to 38C (100.4F), Mild Pain (1 - 3)  ondansetron Injectable 4 milliGRAM(s) IV Push every 6 hours PRN Nausea    
87 yo male brought to ER by EMS after fall at home. Pt states he was walking with walker and "slid" down to his buttock. Pt denies hitting his head. 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 Pt states he had been sliding down frequently. in ED ABC intact HD stabe. No headache no neck pain. no SOB, no fever. No chest or abdominal pain. Mild  left elbow pain abrasion no focal neurological complaint, Moves all extremities  He was noted to have old ecchymosis to left side of face and scalp when asked about it pt states he does not recall hitting his head a head Ct was ordering by the ER physician after his INR was found to be at 6.20. Poor historian, very [place]. (01 Dec 2020 10:00)    12/2: Pt seen and examined, awake, alert, fever better, no SOB, O2 sat WNL. Neurologically stable. Mild left elbow pain, Almost full range of motion.  12/3 no acutre events, CT elbow negative for fracture, still pending MRI of brain for neurosurgical evaluation.  12/4: pt evaled sitting up in bed. no overnight events. MRI scheduled for today   12/7: pt seen and examined sitting up in bed. no overnight events. denies pain or discomfort   12/9: pt seen and examined sitting up in bed while eating. denies pain or discomfort. no n/v. pending placement   12/10: pt seen and examined sitting up in bed. no pain or discomfort. covid tested positive today otherwise status quo   12/11: pt seen and examined sitting up in bed. no pain or discomfort noted by pt. pending retest on 12/13 12/12/20: Patient seen and examined. He denies any complaints. Waiting for rehab bed once COVID-19 negative.  12/13/20: COVID-19 positive today. No new issues  12/14: covid + yesterday. otherwise no complaints.   12/15: pt denies pain or discomfort today. no new complaints. covid retesting today   12/16: pt resting in bed comfortably. no new complaints. covid tested + yesterday   12/17: pt sitting up in bed after breakfast. no new complaints. awaiting covid retesting.   12/18: pt seen and examined sitting up in bed. no new complaints.   12/19: pt sitting up in bed. no pain or discomfort noted. pending negative covid testing.   12/21: pt seen and examined sitting up in bed, resting. no discomfort or pain. pending negative covid swab.   12/22: pt seen and examined in bed. confused per baseline. no discomfort or pain.   12/23: pt lying in bed, confused and asking for his wife. denies discomfort or pain.     Vital Signs Last 24 Hrs  T(C): 36.1 (23 Dec 2020 07:43), Max: 36.7 (22 Dec 2020 15:46)  T(F): 97 (23 Dec 2020 07:43), Max: 98 (22 Dec 2020 15:46)  HR: 81 (23 Dec 2020 07:43) (80 - 105)  BP: 150/71 (23 Dec 2020 07:43) (107/63 - 150/71)  BP(mean): --  RR: 18 (23 Dec 2020 07:43) (18 - 18)  SpO2: 97% (23 Dec 2020 07:43) (93% - 97%) --- room air     PHYSICAL EXAM:    HEENT:  pupils equal and reactive, EOMI, no oropharyngeal lesions, erythema, exudates, oral thrush, scant left orbital hematoma   NECK:   supple, no carotid bruits, no palpable lymph nodes, no thyromegaly  CV:  +S1, +S2, regular, no murmurs or rubs  RESP:   lungs clear to auscultation bilaterally, no wheezing, rales, rhonchi, good air entry bilaterally  GI:  abdomen soft, non-tender, non-distended, normal BS, no bruits, no abdominal masses, no palpable masses  MSK:   normal muscle tone, no atrophy, no rigidity, no contractions  EXT:  no clubbing, no cyanosis, no edema, no calf pain, swelling or erythema. 5/5 strength   VASCULAR:  pulses equal and symmetric in the upper and lower extremities  NEURO:  AAOX2, no gross focal neurological deficits, follows commands, able to move extremities spontaneously  SKIN:  no ulcers, lesions or rashes      MEDICATIONS  (STANDING):  atorvastatin 20 milliGRAM(s) Oral at bedtime  finasteride 5 milliGRAM(s) Oral daily  pantoprazole    Tablet 40 milliGRAM(s) Oral daily  sertraline 50 milliGRAM(s) Oral daily  tamsulosin 0.4 milliGRAM(s) Oral at bedtime    MEDICATIONS  (PRN):  acetaminophen   Tablet .. 650 milliGRAM(s) Oral every 4 hours PRN Temp greater or equal to 38C (100.4F), Mild Pain (1 - 3)  ondansetron Injectable 4 milliGRAM(s) IV Push every 6 hours PRN Nausea    
89 yo male brought to ER by EMS after fall at home. Pt states he was walking with walker and "slid" down to his buttock. Pt denies hitting his head. 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 Pt states he had been sliding down frequently. in ED ABC intact HD stabe. No headache no neck pain. no SOB, no fever. No chest or abdominal pain. Mild  left elbow pain abrasion no focal neurological complaint, Moves all extremities  He was noted to have old ecchymosis to left side of face and scalp when asked about it pt states he does not recall hitting his head a head Ct was ordering by the ER physician after his INR was found to be at 6.20. Poor historian, very [place]. (01 Dec 2020 10:00)    12/2: Pt seen and examined, awake, alert, fever better, no SOB, O2 sat WNL. Neurologically stable. Mild left elbow pain, Almost full range of motion.  12/3 no acutre events, CT elbow negative for fracture, still pending MRI of brain for neurosurgical evaluation.  12/4: pt evaled sitting up in bed. no overnight events. MRI scheduled for today   12/7: pt seen and examined sitting up in bed. no overnight events. denies pain or discomfort   12/9: pt seen and examined sitting up in bed while eating. denies pain or discomfort. no n/v. pending placement   12/10: pt seen and examined sitting up in bed. no pain or discomfort. covid tested positive today otherwise status quo   12/11: pt seen and examined sitting up in bed. no pain or discomfort noted by pt. pending retest on 12/13 12/12/20: Patient seen and examined. He denies any complaints. Waiting for rehab bed once COVID-19 negative.  12/13/20: COVID-19 positive today. No new issues  12/14: covid + yesterday. otherwise no complaints.   12/15: pt denies pain or discomfort today. no new complaints. covid retesting today   12/16: pt resting in bed comfortably. no new complaints. covid tested + yesterday   12/17: pt sitting up in bed after breakfast. no new complaints. awaiting covid retesting.   12/18: pt seen and examined sitting up in bed. no new complaints.   12/19: pt sitting up in bed. no pain or discomfort noted. pending negative covid testing.   12/21: pt seen and examined sitting up in bed, resting. no discomfort or pain. pending negative covid swab.   12/22: pt seen and examined in bed. confused per baseline. no discomfort or pain.   12/23: pt lying in bed, confused and asking for his wife. denies discomfort or pain.   12/24: no complaints. awaiting negative covid testing. retest 12/25 12/25: sitting up in bed eating breakfast. tested negative today. retest tonight.   12/26/20: No new issues. COVID positive today  12/27/20: No new complaints.  12/28: sitting up, interactive. covid testing tonight   12/29: sitting up in bed. covid + this AM.   12/30/20: Looks better today. No new issues. Calm and relaxed.       Vital Signs Last 24 Hrs  T(C): 36.4 (30 Dec 2020 11:14), Max: 37 (29 Dec 2020 22:15)  T(F): 97.5 (30 Dec 2020 11:14), Max: 98.6 (29 Dec 2020 22:15)  HR: 62 (30 Dec 2020 11:14) (62 - 91)  BP: 124/60 (30 Dec 2020 11:14) (124/60 - 133/75)  BP(mean): --  RR: 18 (30 Dec 2020 11:14) (18 - 18)  SpO2: 98% (30 Dec 2020 11:14) (95% - 98%)       PHYSICAL EXAM:    HEENT:  pupils equal and reactive, EOMI, no oropharyngeal lesions, erythema, exudates, oral thrush, scant left orbital hematoma   NECK:   supple, no carotid bruits, no palpable lymph nodes, no thyromegaly  CV:  +S1, +S2, regular, no murmurs or rubs  RESP:   lungs clear to auscultation bilaterally, no wheezing, rales, rhonchi, good air entry bilaterally  GI:  abdomen soft, non-tender, non-distended, normal BS, no bruits, no abdominal masses, no palpable masses  MSK:   normal muscle tone, no atrophy, no rigidity, no contractions  EXT:  no clubbing, no cyanosis, no edema, no calf pain, swelling or erythema. 5/5 strength   VASCULAR:  pulses equal and symmetric in the upper and lower extremities  NEURO:  AAOX2, no gross focal neurological deficits, follows commands, able to move extremities spontaneously  SKIN:  no ulcers, lesions. incontinence rash noted to perineum       MEDICATIONS  (STANDING):  atorvastatin 20 milliGRAM(s) Oral at bedtime  finasteride 5 milliGRAM(s) Oral daily  pantoprazole    Tablet 40 milliGRAM(s) Oral daily  sertraline 50 milliGRAM(s) Oral daily  tamsulosin 0.4 milliGRAM(s) Oral at bedtime    MEDICATIONS  (PRN):  acetaminophen   Tablet .. 650 milliGRAM(s) Oral every 4 hours PRN Temp greater or equal to 38C (100.4F), Mild Pain (1 - 3)  ondansetron Injectable 4 milliGRAM(s) IV Push every 6 hours PRN Nausea    
89 yo male brought to ER by EMS after fall at home. Pt states he was walking with walker and "slid" down to his buttock. Pt denies hitting his head. 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 Pt states he had been sliding down frequently. in ED ABC intact HD stabe. No headache no neck pain. no SOB, no fever. No chest or abdominal pain. Mild  left elbow pain abrasion no focal neurological complaint, Moves all extremities  He was noted to have old ecchymosis to left side of face and scalp when asked about it pt states he does not recall hitting his head a head Ct was ordering by the ER physician after his INR was found to be at 6.20. Poor historian, very [place]. (01 Dec 2020 10:00)    12/2: Pt seen and examined, awake, alert, fever better, no SOB, O2 sat WNL. Neurologically stable. Mild left elbow pain, Almost full range of motion.  12/3 no acutre events, CT elbow negative for fracture, still pending MRI of brain for neurosurgical evaluation.  12/4: pt evaled sitting up in bed. no overnight events. MRI scheduled for today   12/7: pt seen and examined sitting up in bed. no overnight events. denies pain or discomfort   12/9: pt seen and examined sitting up in bed while eating. denies pain or discomfort. no n/v. pending placement   12/10: pt seen and examined sitting up in bed. no pain or discomfort. covid tested positive today otherwise status quo   12/11: pt seen and examined sitting up in bed. no pain or discomfort noted by pt. pending retest on 12/13 12/12/20: Patient seen and examined. He denies any complaints. Waiting for rehab bed once COVID-19 negative.  12/13/20: COVID-19 positive today. No new issues  12/14: covid + yesterday. otherwise no complaints.   12/15: pt denies pain or discomfort today. no new complaints. covid retesting today   12/16: pt resting in bed comfortably. no new complaints. covid tested + yesterday   12/17: pt sitting up in bed after breakfast. no new complaints. awaiting covid retesting.   12/18: pt seen and examined sitting up in bed. no new complaints.   12/19: pt sitting up in bed. no pain or discomfort noted. pending negative covid testing.   12/21: pt seen and examined sitting up in bed, resting. no discomfort or pain. pending negative covid swab.   12/22: pt seen and examined in bed. confused per baseline. no discomfort or pain.     Vital Signs Last 24 Hrs  T(C): 36.7 (22 Dec 2020 08:40), Max: 36.7 (21 Dec 2020 21:39)  T(F): 98 (22 Dec 2020 08:40), Max: 98 (21 Dec 2020 21:39)  HR: 79 (22 Dec 2020 08:40) (68 - 79)  BP: 130/70 (22 Dec 2020 08:40) (119/63 - 152/76)  BP(mean): --  RR: 17 (22 Dec 2020 08:40) (17 - 19)  SpO2: 98% (22 Dec 2020 08:40) (94% - 99%) --- room air     PHYSICAL EXAM:    HEENT:  pupils equal and reactive, EOMI, no oropharyngeal lesions, erythema, exudates, oral thrush, scant left orbital hematoma   NECK:   supple, no carotid bruits, no palpable lymph nodes, no thyromegaly  CV:  +S1, +S2, regular, no murmurs or rubs  RESP:   lungs clear to auscultation bilaterally, no wheezing, rales, rhonchi, good air entry bilaterally  GI:  abdomen soft, non-tender, non-distended, normal BS, no bruits, no abdominal masses, no palpable masses  MSK:   normal muscle tone, no atrophy, no rigidity, no contractions  EXT:  no clubbing, no cyanosis, no edema, no calf pain, swelling or erythema. 5/5 strength   VASCULAR:  pulses equal and symmetric in the upper and lower extremities  NEURO:  AAOX2, no gross focal neurological deficits, follows commands, able to move extremities spontaneously  SKIN:  no ulcers, lesions or rashes        MEDICATIONS  (STANDING):  atorvastatin 20 milliGRAM(s) Oral at bedtime  finasteride 5 milliGRAM(s) Oral daily  pantoprazole    Tablet 40 milliGRAM(s) Oral daily  sertraline 50 milliGRAM(s) Oral daily  tamsulosin 0.4 milliGRAM(s) Oral at bedtime    MEDICATIONS  (PRN):  acetaminophen   Tablet .. 650 milliGRAM(s) Oral every 4 hours PRN Temp greater or equal to 38C (100.4F), Mild Pain (1 - 3)  ondansetron Injectable 4 milliGRAM(s) IV Push every 6 hours PRN Nausea    
89 yo male brought to ER by EMS after fall at home. Pt states he was walking with walker and "slid" down to his buttock. Pt denies hitting his head. 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 Pt states he had been sliding down frequently. in ED ABC intact HD stabe. No headache no neck pain. no SOB, no fever. No chest or abdominal pain. Mild  left elbow pain abrasion no focal neurological complaint, Moves all extremities  He was noted to have old ecchymosis to left side of face and scalp when asked about it pt states he does not recall hitting his head a head Ct was ordering by the ER physician after his INR was found to be at 6.20. Poor historian, very [place]. (01 Dec 2020 10:00)    12/2: Pt seen and examined, awake, alert, fever better, no SOB, O2 sat WNL. Neurologically stable. Mild left elbow pain, Almost full range of motion.  12/3 no acutre events, CT elbow negative for fracture, still pending MRI of brain for neurosurgical evaluation.  12/4: pt evaled sitting up in bed. no overnight events. MRI scheduled for today   12/7: pt seen and examined sitting up in bed. no overnight events. denies pain or discomfort   12/9: pt seen and examined sitting up in bed while eating. denies pain or discomfort. no n/v. pending placement   12/10: pt seen and examined sitting up in bed. no pain or discomfort. covid tested positive today otherwise status quo   12/11: pt seen and examined sitting up in bed. no pain or discomfort noted by pt. pending retest on 12/13 12/12/20: Patient seen and examined. He denies any complaints. Waiting for rehab bed once COVID-19 negative.  12/13/20: COVID-19 positive today. No new issues  12/14: covid + yesterday. otherwise no complaints.   12/15: pt denies pain or discomfort today. no new complaints. covid retesting today   12/16: pt resting in bed comfortably. no new complaints. covid tested + yesterday   12/17: pt sitting up in bed after breakfast. no new complaints. awaiting covid retesting.   12/18: pt seen and examined sitting up in bed. no new complaints.   12/19: pt sitting up in bed. no pain or discomfort noted. pending negative covid testing.   12/21: pt seen and examined sitting up in bed, resting. no discomfort or pain. pending negative covid swab.   12/22: pt seen and examined in bed. confused per baseline. no discomfort or pain.   12/23: pt lying in bed, confused and asking for his wife. denies discomfort or pain.   12/24: no complaints. awaiting negative covid testing. retest 12/25 12/25: sitting up in bed eating breakfast. tested negative today. retest tonight.   12/26/20: No new issues. COVID positive today  12/27/20: No new complaints.    Vital Signs Last 24 Hrs  T(C): 36.6 (27 Dec 2020 09:00), Max: 36.9 (26 Dec 2020 20:05)  T(F): 97.9 (27 Dec 2020 09:00), Max: 98.4 (26 Dec 2020 20:05)  HR: 80 (27 Dec 2020 09:50) (75 - 121)  BP: 139/70 (27 Dec 2020 09:00) (136/71 - 140/72)  BP(mean): --  RR: 18 (27 Dec 2020 09:00) (16 - 18)  SpO2: 95% (27 Dec 2020 09:00) (94% - 96%)      PHYSICAL EXAM:    HEENT:  pupils equal and reactive, EOMI, no oropharyngeal lesions, erythema, exudates, oral thrush, scant left orbital hematoma   NECK:   supple, no carotid bruits, no palpable lymph nodes, no thyromegaly  CV:  +S1, +S2, regular, no murmurs or rubs  RESP:   lungs clear to auscultation bilaterally, no wheezing, rales, rhonchi, good air entry bilaterally  GI:  abdomen soft, non-tender, non-distended, normal BS, no bruits, no abdominal masses, no palpable masses  MSK:   normal muscle tone, no atrophy, no rigidity, no contractions  EXT:  no clubbing, no cyanosis, no edema, no calf pain, swelling or erythema. 5/5 strength   VASCULAR:  pulses equal and symmetric in the upper and lower extremities  NEURO:  AAOX2, no gross focal neurological deficits, follows commands, able to move extremities spontaneously  SKIN:  no ulcers, lesions. incontinence rash noted to perineum       MEDICATIONS  (STANDING):  atorvastatin 20 milliGRAM(s) Oral at bedtime  finasteride 5 milliGRAM(s) Oral daily  pantoprazole    Tablet 40 milliGRAM(s) Oral daily  sertraline 50 milliGRAM(s) Oral daily  tamsulosin 0.4 milliGRAM(s) Oral at bedtime    MEDICATIONS  (PRN):  acetaminophen   Tablet .. 650 milliGRAM(s) Oral every 4 hours PRN Temp greater or equal to 38C (100.4F), Mild Pain (1 - 3)  ondansetron Injectable 4 milliGRAM(s) IV Push every 6 hours PRN Nausea    
89 yo male brought to ER by EMS after fall at home. Pt states he was walking with walker and "slid" down to his buttock. Pt denies hitting his head. 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 Pt states he had been sliding down frequently. in ED ABC intact HD stabe. No headache no neck pain. no SOB, no fever. No chest or abdominal pain. Mild  left elbow pain abrasion no focal neurological complaint, Moves all extremities  He was noted to have old ecchymosis to left side of face and scalp when asked about it pt states he does not recall hitting his head a head Ct was ordering by the ER physician after his INR was found to be at 6.20. Poor historian, very [place]. (01 Dec 2020 10:00)    12/2: Pt seen and examined, awake, alert, fever better, no SOB, O2 sat WNL. Neurologically stable. Mild left elbow pain, Almost full range of motion.  12/3 no acutre events, CT elbow negative for fracture, still pending MRI of brain for neurosurgical evaluation.  12/4: pt evaled sitting up in bed. no overnight events. MRI scheduled for today   12/7: pt seen and examined sitting up in bed. no overnight events. denies pain or discomfort   12/9: pt seen and examined sitting up in bed while eating. denies pain or discomfort. no n/v. pending placement   12/10: pt seen and examined sitting up in bed. no pain or discomfort. covid tested positive today otherwise status quo   12/11: pt seen and examined sitting up in bed. no pain or discomfort noted by pt. pending retest on 12/13 12/12/20: Patient seen and examined. He denies any complaints. Waiting for rehab bed once COVID-19 negative.  12/13/20: COVID-19 positive today. No new issues  12/14: covid + yesterday. otherwise no complaints.   12/15: pt denies pain or discomfort today. no new complaints. covid retesting today   12/16: pt resting in bed comfortably. no new complaints. covid tested + yesterday   12/17: pt sitting up in bed after breakfast. no new complaints. awaiting covid retesting.   12/18: pt seen and examined sitting up in bed. no new complaints.   12/19: pt sitting up in bed. no pain or discomfort noted. pending negative covid testing.   12/21: pt seen and examined sitting up in bed, resting. no discomfort or pain. pending negative covid swab.   12/22: pt seen and examined in bed. confused per baseline. no discomfort or pain.   12/23: pt lying in bed, confused and asking for his wife. denies discomfort or pain.   12/24: no complaints. awaiting negative covid testing. retest 12/25     Vital Signs Last 24 Hrs  T(C): 37 (24 Dec 2020 17:15), Max: 37 (24 Dec 2020 17:15)  T(F): 98.6 (24 Dec 2020 17:15), Max: 98.6 (24 Dec 2020 17:15)  HR: 77 (24 Dec 2020 17:15) (77 - 88)  BP: 134/69 (24 Dec 2020 17:15) (95/56 - 134/69)  BP(mean): --  RR: 17 (24 Dec 2020 17:15) (17 - 18)  SpO2: 100% (24 Dec 2020 17:15) (95% - 100%) ---- room air     PHYSICAL EXAM:    HEENT:  pupils equal and reactive, EOMI, no oropharyngeal lesions, erythema, exudates, oral thrush, scant left orbital hematoma   NECK:   supple, no carotid bruits, no palpable lymph nodes, no thyromegaly  CV:  +S1, +S2, regular, no murmurs or rubs  RESP:   lungs clear to auscultation bilaterally, no wheezing, rales, rhonchi, good air entry bilaterally  GI:  abdomen soft, non-tender, non-distended, normal BS, no bruits, no abdominal masses, no palpable masses  MSK:   normal muscle tone, no atrophy, no rigidity, no contractions  EXT:  no clubbing, no cyanosis, no edema, no calf pain, swelling or erythema. 5/5 strength   VASCULAR:  pulses equal and symmetric in the upper and lower extremities  NEURO:  AAOX2, no gross focal neurological deficits, follows commands, able to move extremities spontaneously  SKIN:  no ulcers, lesions. incontinence rash noted to perineum       MEDICATIONS  (STANDING):  atorvastatin 20 milliGRAM(s) Oral at bedtime  finasteride 5 milliGRAM(s) Oral daily  pantoprazole    Tablet 40 milliGRAM(s) Oral daily  sertraline 50 milliGRAM(s) Oral daily  tamsulosin 0.4 milliGRAM(s) Oral at bedtime    MEDICATIONS  (PRN):  acetaminophen   Tablet .. 650 milliGRAM(s) Oral every 4 hours PRN Temp greater or equal to 38C (100.4F), Mild Pain (1 - 3)  ondansetron Injectable 4 milliGRAM(s) IV Push every 6 hours PRN Nausea    
89 yo male brought to ER by EMS after fall at home. Pt states he was walking with walker and "slid" down to his buttock. Pt denies hitting his head. 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 Pt states he had been sliding down frequently. in ED ABC intact HD stabe. No headache no neck pain. no SOB, no fever. No chest or abdominal pain. Mild  left elbow pain abrasion no focal neurological complaint, Moves all extremities  He was noted to have old ecchymosis to left side of face and scalp when asked about it pt states he does not recall hitting his head a head Ct was ordering by the ER physician after his INR was found to be at 6.20. Poor historian, very [place]. (01 Dec 2020 10:00)    12/2: Pt seen and examined, awake, alert, fever better, no SOB, O2 sat WNL. Neurologically stable. Mild left elbow pain, Almost full range of motion.  12/3 no acutre events, CT elbow negative for fracture, still pending MRI of brain for neurosurgical evaluation.  12/4: pt evaled sitting up in bed. no overnight events. MRI scheduled for today   12/7: pt seen and examined sitting up in bed. no overnight events. denies pain or discomfort   12/9: pt seen and examined sitting up in bed while eating. denies pain or discomfort. no n/v. pending placement   12/10: pt seen and examined sitting up in bed. no pain or discomfort. covid tested positive today otherwise status quo   12/11: pt seen and examined sitting up in bed. no pain or discomfort noted by pt. pending retest on 12/13 12/12/20: Patient seen and examined. He denies any complaints. Waiting for rehab bed once COVID-19 negative.  12/13/20: COVID-19 positive today. No new issues  12/14: covid + yesterday. otherwise no complaints.   12/15: pt denies pain or discomfort today. no new complaints. covid retesting today   12/16: pt resting in bed comfortably. no new complaints. covid tested + yesterday   12/17: pt sitting up in bed after breakfast. no new complaints. awaiting covid retesting.   12/18: pt seen and examined sitting up in bed. no new complaints.   12/19: pt sitting up in bed. no pain or discomfort noted. pending negative covid testing.   12/21: pt seen and examined sitting up in bed, resting. no discomfort or pain. pending negative covid swab.   12/22: pt seen and examined in bed. confused per baseline. no discomfort or pain.   12/23: pt lying in bed, confused and asking for his wife. denies discomfort or pain.   12/24: no complaints. awaiting negative covid testing. retest 12/25 12/25: sitting up in bed eating breakfast. tested negative today. retest tonight.   12/26/20: No new issues. COVID positive today  12/27/20: No new complaints.  12/28: sitting up, interactive. covid testing tonight   12/29: sitting up in bed. covid + this AM.     Vital Signs Last 24 Hrs  T(C): 36.3 (29 Dec 2020 09:27), Max: 36.3 (28 Dec 2020 16:40)  T(F): 97.4 (29 Dec 2020 09:27), Max: 97.4 (29 Dec 2020 09:27)  HR: 91 (29 Dec 2020 09:27) (75 - 91)  BP: 116/78 (29 Dec 2020 09:27) (116/78 - 160/70)  BP(mean): --  RR: 18 (29 Dec 2020 09:27) (18 - 18)  SpO2: 95% (29 Dec 2020 09:27) (95% - 99%) --- room air     PHYSICAL EXAM:    HEENT:  pupils equal and reactive, EOMI, no oropharyngeal lesions, erythema, exudates, oral thrush, scant left orbital hematoma   NECK:   supple, no carotid bruits, no palpable lymph nodes, no thyromegaly  CV:  +S1, +S2, regular, no murmurs or rubs  RESP:   lungs clear to auscultation bilaterally, no wheezing, rales, rhonchi, good air entry bilaterally  GI:  abdomen soft, non-tender, non-distended, normal BS, no bruits, no abdominal masses, no palpable masses  MSK:   normal muscle tone, no atrophy, no rigidity, no contractions  EXT:  no clubbing, no cyanosis, no edema, no calf pain, swelling or erythema. 5/5 strength   VASCULAR:  pulses equal and symmetric in the upper and lower extremities  NEURO:  AAOX2, no gross focal neurological deficits, follows commands, able to move extremities spontaneously  SKIN:  no ulcers, lesions. incontinence rash noted to perineum       MEDICATIONS  (STANDING):  atorvastatin 20 milliGRAM(s) Oral at bedtime  finasteride 5 milliGRAM(s) Oral daily  pantoprazole    Tablet 40 milliGRAM(s) Oral daily  sertraline 50 milliGRAM(s) Oral daily  tamsulosin 0.4 milliGRAM(s) Oral at bedtime    MEDICATIONS  (PRN):  acetaminophen   Tablet .. 650 milliGRAM(s) Oral every 4 hours PRN Temp greater or equal to 38C (100.4F), Mild Pain (1 - 3)  ondansetron Injectable 4 milliGRAM(s) IV Push every 6 hours PRN Nausea

## 2021-01-07 NOTE — PROGRESS NOTE ADULT - PROVIDER SPECIALTY LIST ADULT
Hospitalist
Neurosurgery
Neurosurgery
Trauma Surgery
Hospitalist
Trauma Surgery
Hospitalist
Hospitalist
Trauma Surgery
Trauma Surgery
Hospitalist
Hospitalist
Trauma Surgery
Hospitalist

## 2021-01-07 NOTE — DISCHARGE NOTE PROVIDER - HOSPITAL COURSE
pt is a 89 y/o male w/ pmhx of CVA, AFIB (on coumadin), HLD and BPH presented on 12/1 for eval of head injury/slip and fall out of wheelchair. pt was found to have intraventricular bleeding secondary to the fall. no surgical interventions or trauma interventions were indicated at the time. pt also found to be COVID +. no indications for remdesivir or dexamethasone at time of admission. pt has continually tested positive and placement was a complication. pt requires advanced assistance for ADLS and cannot safely return home. He can be transitioned to [facility] for further rehabilitation/placement.     Vital Signs Last 24 Hrs  T(C): 36.6 (06 Jan 2021 16:52), Max: 36.6 (06 Jan 2021 16:52)  T(F): 97.9 (06 Jan 2021 16:52), Max: 97.9 (06 Jan 2021 16:52)  HR: 59 (07 Jan 2021 09:15) (59 - 60)  BP: 134/72 (07 Jan 2021 09:15) (113/73 - 134/72)  BP(mean): --  RR: 18 (06 Jan 2021 16:52) (18 - 18)  SpO2: 94% (07 Jan 2021 09:15) (94% - 96%)    labs reviewed       HEENT:  pupils equal and reactive, EOMI, no oropharyngeal lesions, erythema, exudates, oral thrush,   NECK:   supple, no carotid bruits, no palpable lymph nodes, no thyromegaly  CV:  +S1, +S2, regular, no murmurs or rubs  RESP:   lungs clear to auscultation bilaterally, no wheezing, rales, rhonchi, good air entry bilaterally  GI:  abdomen soft, non-tender, non-distended, normal BS, no bruits, no abdominal masses, no palpable masses  MSK:   normal muscle tone, no atrophy, no rigidity, no contractions  EXT:  no clubbing, no cyanosis, no edema, no calf pain, swelling or erythema. 5/5 strength   VASCULAR:  pulses equal and symmetric in the upper and lower extremities  NEURO:  AAOX2, no gross focal neurological deficits, follows commands, able to move extremities spontaneously  SKIN:  no ulcers, lesions. incontinence rash noted to perineum         88 y old male on A/C fell off his bed, c/O left arm pain, no LOC, found to have IVH. SAH. Possible left epicondylar fracture. COVID positive.    # acute subdural hematoma and intraventricular hemorrhage S/p fall --- stable   - no neuro deficits   - off coumadin    - neurosurgery no interventions   - PT eval/ OOB / requires bartolo lift and full assist OOB     #COVID positive --- acute illness resolved  - s/p dexamethasone     #UTI  - asymptomatic UTI no need for abx at this time.     #Afib  - Rate controlled for now  - no AC at this time given SAH and IVH     # r/o LUE fracture  - degenerative changes without acute fracture.     dispo: pending sub acute rehab placement , requires 2 negative swabs.   neg COVID 1/1  pos COVID 1/2  pos COVID 1/5  COVID test now, anticipate discharge to rehab tomorrow. CC: Fall  HPI/Hospital course:  pt is a 87 y/o male w/ pmhx of CVA, AFIB (on coumadin), HLD and BPH presented on 12/1 for eval of head injury/slip and fall out of wheelchair. pt was found to have intraventricular bleeding secondary to the fall. no surgical interventions or trauma interventions were indicated at the time. pt also found to be COVID +. no indications for remdesivir or dexamethasone at time of admission. pt has continually tested positive and placement was a complication. pt requires advanced assistance for ADLS and cannot safely return home. He can be transitioned to [facility] for further rehabilitation/placement.     REVIEW OF SYSTEMS: All other review of systems is negative unless indicated above.    Vital Signs Last 24 Hrs  T(C): 36.6 (06 Jan 2021 16:52), Max: 36.6 (06 Jan 2021 16:52)  T(F): 97.9 (06 Jan 2021 16:52), Max: 97.9 (06 Jan 2021 16:52)  HR: 59 (07 Jan 2021 09:15) (59 - 60)  BP: 134/72 (07 Jan 2021 09:15) (113/73 - 134/72)  BP(mean): --  RR: 18 (06 Jan 2021 16:52) (18 - 18)  SpO2: 94% (07 Jan 2021 09:15) (94% - 96%)    labs reviewed       HEENT:  pupils equal and reactive, EOMI, no oropharyngeal lesions, erythema, exudates, oral thrush,   NECK:   supple, no carotid bruits, no palpable lymph nodes, no thyromegaly  CV:  +S1, +S2, regular, no murmurs or rubs  RESP:   lungs clear to auscultation bilaterally, no wheezing, rales, rhonchi, good air entry bilaterally  GI:  abdomen soft, non-tender, non-distended, normal BS, no bruits, no abdominal masses, no palpable masses  MSK:   normal muscle tone, no atrophy, no rigidity, no contractions  EXT:  no clubbing, no cyanosis, no edema, no calf pain, swelling or erythema. 5/5 strength   VASCULAR:  pulses equal and symmetric in the upper and lower extremities  NEURO:  AAOX2, no gross focal neurological deficits, follows commands, able to move extremities spontaneously  SKIN:  no ulcers, lesions. incontinence rash noted to perineum         88 y old male on A/C fell off his bed, c/O left arm pain, no LOC, found to have IVH. SAH. Possible left epicondylar fracture. COVID positive.    # acute subdural hematoma and intraventricular hemorrhage S/p fall --- stable   - no neuro deficits   - off coumadin    - neurosurgery no interventions   - PT eval/ OOB / requires bartolo lift and full assist OOB     #COVID positive --- acute illness resolved  - s/p dexamethasone     #UTI  - asymptomatic UTI no need for abx at this time.     #Afib  - Rate controlled for now  - no AC at this time given SAH and IVH     # r/o LUE fracture  - degenerative changes without acute fracture.     dispo: pending sub acute rehab placement , requires 2 negative swabs.   neg COVID 1/1  pos COVID 1/2  pos COVID 1/5    Attending Attestation:  Pt seen and examined with JOCELYNE Birmingham. I personally had a face to face encounter with the patient, examined the patient myself and reviewed the plan of care with the patient and said JOCELYNE Birmingham. I agree with the assessment and plan of JOCELYNE Birmingham as stated and discussed.  Pt HD stable, feeling well, discharge to [facility] for further care.

## 2021-01-07 NOTE — DISCHARGE NOTE NURSING/CASE MANAGEMENT/SOCIAL WORK - PATIENT PORTAL LINK FT
You can access the FollowMyHealth Patient Portal offered by [facility] by registering at the following website: [email or web address]. By joining [facility]’s FollowMyHealth portal, you will also be able to view your health information using other applications (apps) compatible with our system.

## 2021-01-11 DIAGNOSIS — Z86.73 PERSONAL HISTORY OF TRANSIENT ISCHEMIC ATTACK (TIA), AND CEREBRAL INFARCTION WITHOUT RESIDUAL DEFICITS: ICD-10-CM

## 2021-01-11 DIAGNOSIS — N40.0 BENIGN PROSTATIC HYPERPLASIA WITHOUT LOWER URINARY TRACT SYMPTOMS: ICD-10-CM

## 2021-01-11 DIAGNOSIS — R40.2252 COMA SCALE, BEST VERBAL RESPONSE, ORIENTED, AT ARRIVAL TO EMERGENCY DEPARTMENT: ICD-10-CM

## 2021-01-11 DIAGNOSIS — N39.0 URINARY TRACT INFECTION, SITE NOT SPECIFIED: ICD-10-CM

## 2021-01-11 DIAGNOSIS — S50.02XA CONTUSION OF LEFT ELBOW, INITIAL ENCOUNTER: ICD-10-CM

## 2021-01-11 DIAGNOSIS — S06.5X0A TRAUMATIC SUBDURAL HEMORRHAGE WITHOUT LOSS OF CONSCIOUSNESS, INITIAL ENCOUNTER: ICD-10-CM
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
## 2021-01-11 DIAGNOSIS — Z79.01 LONG TERM (CURRENT) USE OF ANTICOAGULANTS: ICD-10-CM

## 2021-01-11 DIAGNOSIS — R40.2142 COMA SCALE, EYES OPEN, SPONTANEOUS, AT ARRIVAL TO EMERGENCY DEPARTMENT: ICD-10-CM
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
## 2021-01-11 DIAGNOSIS — R40.2362 COMA SCALE, BEST MOTOR RESPONSE, OBEYS COMMANDS, AT ARRIVAL TO EMERGENCY DEPARTMENT: ICD-10-CM

## 2021-01-11 DIAGNOSIS — S06.5X9A TRAUMATIC SUBDURAL HEMORRHAGE WITH LOSS OF CONSCIOUSNESS OF UNSPECIFIED DURATION, INITIAL ENCOUNTER: ICD-10-CM

## 2021-01-11 DIAGNOSIS — S06.890A OTHER SPECIFIED INTRACRANIAL INJURY WITHOUT LOSS OF CONSCIOUSNESS, INITIAL ENCOUNTER: ICD-10-CM

## 2021-01-11 DIAGNOSIS — F32.9 MAJOR DEPRESSIVE DISORDER, SINGLE EPISODE, UNSPECIFIED: ICD-10-CM

## 2021-01-11 DIAGNOSIS — I48.20 CHRONIC ATRIAL FIBRILLATION, UNSPECIFIED: ICD-10-CM

## 2021-01-11 DIAGNOSIS — S06.6X0A TRAUMATIC SUBARACHNOID HEMORRHAGE WITHOUT LOSS OF CONSCIOUSNESS, INITIAL ENCOUNTER: ICD-10-CM

## 2021-01-11 DIAGNOSIS — U07.1 COVID-19: ICD-10-CM
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
## 2021-01-26 PROBLEM — Z00.00 ENCOUNTER FOR PREVENTIVE HEALTH EXAMINATION: Status: ACTIVE | Noted: 2021-01-26

## 2021-03-09 NOTE — H&P ADULT - DOES THIS PATIENT HAVE A HISTORY OF OR HAS BEEN DX WITH HEART FAILURE?
Patients father Ky wants to know if Dr. Antonio would put a referral for Dermatology. Patient has had a red jessie underneath the right eye for 8 months.    Please call Ky, ok to leave detailed message.   no

## 2021-08-12 NOTE — CDI QUERY NOTE - NSCDITREATMDFT_GEN_ALL_CORE_HH
"Chief Complaint  Annual Exam (Pt presents here today for annual physical.) and Ankle Pain (Mild swelling and pain x 2weeks.)    Subjective          Grant MCCARTHY Minesh Henao presents to [facility]  Patient presents for annual physical.  This is a 51-year-old male.    He has hypertension and takes benazepril 20 mg daily and HCTZ 12.5 mg daily, Toprol-XL 50 mg daily with good compliance.  He does not check his blood pressures at home routinely.  No headaches, visual changes, shortness of breath or chest discomfort.    He has chronic DVTs, has had 2 unprovoked DVTs and sees Dr. Cowart, hematology.  Per hematology, he should remain on anticoagulation indefinitely and takes Xarelto.    He has hypothyroidism and takes levothyroxine 112 mcg daily with good compliance.    He is prediabetic, last A1c was 6.3 on 3/11/2021.    He has right foot/ankle pain, lateral aspect of the right foot/ankle.  He had an elevated uric acid level about 1 year ago.  He wonders whether this is gout or musculoskeletal complete.  He denies any known injuries but states that it started after going back to work from being on vacation.  Mild swelling, no erythema or heat.    He is up-to-date on his dental and vision exams.    He has lost about 13 pounds intentionally through diet and lifestyle modifications.    He is a never smoker and does not use alcohol regularly.    Overall reports that he is doing well and denies development of any other new issues today.      Objective   Vital Signs:   /87 (BP Location: Right arm, Patient Position: Sitting, Cuff Size: Large Adult)   Pulse 77   Temp 98.1 °F (36.7 °C)   Resp 18   Ht 177.8 cm (70\")   Wt 136 kg (299 lb)   SpO2 96%   BMI 42.90 kg/m²     Physical Exam  Vitals and nursing note reviewed.   Constitutional:       General: He is not in acute distress.     Appearance: Normal appearance. He is well-developed. He is obese. He is not ill-appearing, toxic-appearing or " Dr. Estevez diaphoretic.   HENT:      Head: Normocephalic and atraumatic.      Right Ear: Tympanic membrane, ear canal and external ear normal.      Left Ear: Tympanic membrane, ear canal and external ear normal.   Eyes:      Pupils: Pupils are equal, round, and reactive to light.   Neck:      Vascular: No carotid bruit.   Cardiovascular:      Rate and Rhythm: Normal rate and regular rhythm.      Pulses: Normal pulses.      Heart sounds: Normal heart sounds.      Comments: No peripheral edema  Pulmonary:      Effort: Pulmonary effort is normal. No respiratory distress.      Breath sounds: Normal breath sounds. No stridor. No wheezing, rhonchi or rales.   Chest:      Chest wall: No tenderness.   Abdominal:      General: Bowel sounds are normal. There is no distension.      Palpations: Abdomen is soft. There is no mass.      Tenderness: There is no abdominal tenderness. There is no right CVA tenderness, left CVA tenderness, guarding or rebound.      Hernia: No hernia is present.   Musculoskeletal:         General: Normal range of motion.      Cervical back: Normal range of motion and neck supple. No rigidity or tenderness.   Lymphadenopathy:      Cervical: No cervical adenopathy.   Skin:     General: Skin is warm and dry.      Capillary Refill: Capillary refill takes less than 2 seconds.   Neurological:      General: No focal deficit present.      Mental Status: He is alert and oriented to person, place, and time. Mental status is at baseline.   Psychiatric:         Mood and Affect: Mood normal.         Behavior: Behavior normal.         Thought Content: Thought content normal.         Judgment: Judgment normal.        Result Review :   The following data was reviewed by: MELANY Thornton on 08/12/2021:  Common labs    Common Labsle 11/12/20 11/12/20 11/12/20 11/12/20 11/12/20 11/12/20 3/11/21 3/11/21 3/11/21 3/11/21 3/11/21 7/29/21    1522 1522 1522 1522 1522 1522 1558 1558 1558 1558 1558    Glucose  78      120 (A)        BUN  10      10       Creatinine  1.16      1.15       eGFR Non  Am  67      67       eGFR  Am  81      82       Sodium  136      141       Potassium  4.0      3.7       Chloride  98      102       Calcium  9.4      9.1       Total Protein  6.7      6.3       Albumin  4.30      4.10       Total Bilirubin  1.5 (A)      0.9       Alkaline Phosphatase  106      93       AST (SGOT)  17      19       ALT (SGPT)  18      24       WBC 8.29      7.42     6.62   Hemoglobin 16.4      15.8     17.4   Hematocrit 51.6 (A)      48.2     51.9 (A)   Platelets 257      271     228   Total Cholesterol   146      157      Triglycerides   68      171 (A)      HDL Cholesterol   45      35 (A)      LDL Cholesterol    87      92      Hemoglobin A1C    6.00 (A)      6.30 (A)     PSA     1.850      1.670    Uric Acid      6.7         (A) Abnormal value       Comments are available for some flowsheets but are not being displayed.           Current outpatient and discharge medications have been reconciled for the patient.  Reviewed by: MELANY Thornton           Assessment and Plan    Diagnoses and all orders for this visit:    1. Annual physical exam (Primary)    2. Benign essential hypertension  Assessment & Plan:  Continue benazepril, Toprol-XL and HCTZ.  I have asked him to check his home blood pressures regularly, goal of less than 130/80 discussed and if he sees readings higher than this consistently he will notify me.    Orders:  -     Comprehensive metabolic panel  -     Lipid panel    3. Acute deep vein thrombosis (DVT) of popliteal vein of left lower extremity (CMS/HCC)  Assessment & Plan:  Stable, continue chronic anticoagulation, now on Xarelto.  Continue routine hematology follow-ups.      4. Other specified hypothyroidism  Assessment & Plan:  Continue levothyroxine 112 mcg daily.  TSH, free T4 today and we will adjust therapy if needed.    Orders:  -     TSH  -     T4, Free    5. Prediabetes  Assessment &  Plan:  Continue healthy diet modifications.  A1c today and we will adjust therapy if needed.    Orders:  -     Hemoglobin A1c    6. Health care maintenance  Assessment & Plan:  Continue with routine dental, vision exams.    He is due for colorectal cancer screening which I ordered-colonoscopy.    Orders:  -     Ambulatory Referral For Screening Colonoscopy    7. Hyperuricemia  -     Uric acid  -     XR Ankle 3+ View Right (In Office)  -     XR Foot 3+ View Right    8. Pain in joint involving right ankle and foot  Comments:  Musculoskeletal versus gout.  Checking uric acid levels today along with x-ray of the right foot and ankle.  Advised Voltaren gel and stabilizing with Ace wrap.  Orders:  -     Uric acid  -     XR Ankle 3+ View Right (In Office)  -     XR Foot 3+ View Right    9. Colon cancer screening  -     Ambulatory Referral For Screening Colonoscopy    We will contact patient with lab and imaging results and further recommendations.  Follow-up as needed and I will see him back in 3 months for routine health maintenance/recheck of chronic conditions.    Follow Up   Return in about 3 months (around 11/12/2021).  Patient was given instructions and counseling regarding his condition or for health maintenance advice. Please see specific information pulled into the AVS if appropriate.

## 2022-09-26 PROBLEM — I63.9 CEREBRAL INFARCTION, UNSPECIFIED: Chronic | Status: ACTIVE | Noted: 2020-12-01

## 2022-10-18 ENCOUNTER — APPOINTMENT (OUTPATIENT)
Dept: ORTHOPEDIC SURGERY | Facility: CLINIC | Age: 87
End: 2022-10-18

## 2022-10-18 DIAGNOSIS — M25.512 PAIN IN LEFT SHOULDER: ICD-10-CM

## 2022-10-18 DIAGNOSIS — G89.29 PAIN IN LEFT SHOULDER: ICD-10-CM

## 2022-10-18 DIAGNOSIS — M19.012 PRIMARY OSTEOARTHRITIS, LEFT SHOULDER: ICD-10-CM

## 2022-10-18 PROCEDURE — 73030 X-RAY EXAM OF SHOULDER: CPT | Mod: LT

## 2022-10-18 PROCEDURE — 20611 DRAIN/INJ JOINT/BURSA W/US: CPT

## 2022-10-18 PROCEDURE — 99204 OFFICE O/P NEW MOD 45 MIN: CPT | Mod: 25

## 2022-10-18 NOTE — DISCUSSION/SUMMARY
[de-identified] : The patient has left shoulder severe GHOA.\par \par \par The patient is not a surgical candidate at this time given his advanced age and medical conditions.\par Non operative treatment options were discussed with the patient. \par The patient received a glenohumeral steroid injection into the left shoulder today.\par The patient can have these injections every 6 weeks as needed.\par The patient will follow up with  as needed.\par \par \par I, Dr. Dwaine Chang, attest that this documentation was recorded by the scribe, in my presence, and that it accurately reflects the service I personally performed, and the decisions made by me with my edits as appropriate.  \par \par I, Dar Albright, acting as scribe, attest that this documentation has been prepared under the direction and in the presence of Provider Dwaine Chang MD.\par

## 2022-10-18 NOTE — PHYSICAL EXAM
[Left] : left shoulder [2___] : external rotation 2[unfilled]/5 [de-identified] : Constitutional: The general appearance of the patient is well developed, well nourished, no deformities and well groomed.Normal\par  \par Gait:The patient is wheelchair bound.\par \par Skin: Head and neck visualized skin is normal.Left upper extremity visualized skin is normal.Right upper extremity visualized skin is normal.\par  \par Cardiovascular: palpable radial pulse bilaterally. \par  \par Neurologic: The patient is oriented to time, place and person.Sensation to light touch intact in the bilateral upper extremities.Mood and Affect is normal. \par \par  [] : no deformity [FreeTextEntry8] : No tenderness at the AC joint, biceps tendon or supraspinatus. [FreeTextEntry9] : Unable to assess internal rotation. [FreeTextEntry1] : severe GHOA. high riding humeral head. moderate AC joint arthritis. [de-identified] : 2 out of 5 supraspinatus [TWNoteComboBox7] : active forward flexion 30 degrees [de-identified] : active abduction 20 degrees [de-identified] : external rotation 0 degrees

## 2022-10-18 NOTE — HISTORY OF PRESENT ILLNESS
[de-identified] : The patient is a 89 year old male here with complaints of left shoulder pain for the past 4 weeks. The pain is to the superior shoulder. He has not had any injuries or falls. The pain is worse when he lifts his arm. He currently has not had treatment, no medications, PT or injections. \par \par The patient has a history of a CVA with left sided hemiparesis. He states he had better function to the upper extremity prior to 4 weeks ago.

## 2022-10-18 NOTE — REASON FOR VISIT
[Formal Caregiver] : formal caregiver [FreeTextEntry2] : The patient is a new patient with complaints of left shoulder pain for the past 4 weeks.

## 2022-10-18 NOTE — PROCEDURE
[Large Joint Injection] : Large joint injection [Glenohumeral Joint] : glenohumeral joint [___ cc    1%] : Lidocaine ~Vcc of 1%  [___ cc    40mg] : Methylprednisolone (Depomedrol) ~Vcc of 40 mg  [Call if redness, pain or fever occur] : call if redness, pain or fever occur [Apply ice for 15min out of every hour for the next 12-24 hours as tolerated] : apply ice for 15 minutes out of every hour for the next 12-24 hours as tolerated [Patient was advised to rest the joint(s) for ____ days] : patient was advised to rest the joint(s) for [unfilled] days [Previous OTC use and PT nontherapeutic] : patient has tried OTC's including aspirin, Ibuprofen, Aleve, etc or prescription NSAIDS, and/or exercises at home and/or physical therapy without satisfactory response [Patient had decreased mobility in the joint] : patient had decreased mobility in the joint [Risks, benefits, alternatives discussed / Verbal consent obtained] : the risks benefits, and alternatives have been discussed, and verbal consent was obtained [Glenohmeral injection] : glenohumeral injection [All ultrasound images have been permanently captured and stored accordingly in our picture archiving and communication system] : All ultrasound images have been permanently captured and stored accordingly in our picture archiving and communication system [Visualization of the needle and placement of injection was performed without complication] : visualization of the needle and placement of injection was performed without complication [Left] : of the left

## 2022-11-09 NOTE — ED ADULT TRIAGE NOTE - IDEAL BODY WEIGHT(KG)
80 Acitretin Pregnancy And Lactation Text: This medication is Pregnancy Category X and should not be given to women who are pregnant or may become pregnant in the future. This medication is excreted in breast milk.

## 2023-02-28 ENCOUNTER — NON-APPOINTMENT (OUTPATIENT)
Age: 88
End: 2023-02-28

## 2023-02-28 ENCOUNTER — APPOINTMENT (OUTPATIENT)
Dept: OPHTHALMOLOGY | Facility: CLINIC | Age: 88
End: 2023-02-28
Payer: MEDICARE

## 2023-02-28 PROCEDURE — 92004 COMPRE OPH EXAM NEW PT 1/>: CPT

## 2024-12-10 NOTE — PATIENT PROFILE ADULT - LIVING ENVIRONMENT
Patient admitted having significant diarrhea-initially suspected infectious secondary to COVID versus C. difficile carrier status  Started on a course of oral vancomycin and completed-Per infectious disease do not feel that this is related to his diarrhea  Given lack of improvement in diarrhea-GI consulted  Patient continues to deny wanting to start any other medications-would not like a powder and does not feel that he can handle a large pill.  GI feels that Imodium would not be a good option  Lolaran helped when patient took it, but stating that it made him nauseous and he will not take it again  Willing to try a pill to help, since we do not carry colestid, will order creon  Diarrhea is improving   Having 2-3 loose BM a day    no